# Patient Record
Sex: FEMALE | Race: AMERICAN INDIAN OR ALASKA NATIVE | Employment: PART TIME | URBAN - METROPOLITAN AREA
[De-identification: names, ages, dates, MRNs, and addresses within clinical notes are randomized per-mention and may not be internally consistent; named-entity substitution may affect disease eponyms.]

---

## 2018-02-01 ENCOUNTER — HOSPITAL ENCOUNTER (EMERGENCY)
Age: 48
Discharge: HOME | End: 2018-02-01
Attending: EMERGENCY MEDICINE
Payer: SELF-PAY

## 2018-02-01 ENCOUNTER — APPOINTMENT (OUTPATIENT)
Dept: GENERAL RADIOLOGY | Age: 48
End: 2018-02-01
Attending: EMERGENCY MEDICINE
Payer: SELF-PAY

## 2018-02-01 VITALS
BODY MASS INDEX: 31.01 KG/M2 | TEMPERATURE: 98 F | HEIGHT: 63 IN | DIASTOLIC BLOOD PRESSURE: 104 MMHG | RESPIRATION RATE: 15 BRPM | SYSTOLIC BLOOD PRESSURE: 154 MMHG | WEIGHT: 175 LBS | OXYGEN SATURATION: 100 % | HEART RATE: 85 BPM

## 2018-02-01 DIAGNOSIS — M25.562 ACUTE PAIN OF LEFT KNEE: Primary | ICD-10-CM

## 2018-02-01 PROCEDURE — 73564 X-RAY EXAM KNEE 4 OR MORE: CPT

## 2018-02-01 PROCEDURE — 99282 EMERGENCY DEPT VISIT SF MDM: CPT

## 2018-02-01 NOTE — ED PROVIDER NOTES
EMERGENCY DEPARTMENT HISTORY AND PHYSICAL EXAM    4:26 PM      Date: 2/1/2018  Patient Name: Elana Gonzales    History of Presenting Illness     Chief Complaint   Patient presents with    Knee Pain         History Provided By: Patient     Chief Complaint: Knee Pain  Duration:  Onset Thanksgiving 2017  Timing:  Intermittent   Location: Left Knee  Quality: Tightness  Severity: 6/10  Modifying Factors: Patient has been using Ice and Ibuprofen as pain interventions, with no relief in her symptoms. Notes that the tightness is exacerbated when bending. Associated Symptoms: Denies any other associated symptoms or concerns       Additional History (Context): Elana Gonzales is a 56 y.o. female presents with left knee pain since Thanksgiving 2017. Patient states that on Thanksgiving, she was using a chair as a ladder, and fell off of the chair onto her left knee. Patient has been using Ice and Ibuprofen as pain interventions, with no relief in her symptoms. Notes that the tightness is exacerbated when bending. Admits to current Tobacco use. Patient expresses no other concerns or symptoms at this time. PCP: Emani Echevarria MD        Past History     Past Medical History:  Past Medical History:   Diagnosis Date    Hypertension        Past Surgical History:  Past Surgical History:   Procedure Laterality Date    HX BREAST BIOPSY         Family History:  No family history on file. Social History:  Social History   Substance Use Topics    Smoking status: Current Every Day Smoker     Packs/day: 1.00    Smokeless tobacco: Never Used    Alcohol use Yes      Comment: rarely       Allergies: Allergies   Allergen Reactions    Dextromethorphan Rash    Penicillins Rash         Review of Systems     Review of Systems   Constitutional: Negative for diaphoresis and fever. HENT: Negative for congestion and sore throat. Eyes: Negative for pain and itching. Respiratory: Negative for cough and shortness of breath. Cardiovascular: Negative for chest pain and palpitations. Gastrointestinal: Negative for abdominal pain and diarrhea. Endocrine: Negative for polydipsia and polyuria. Genitourinary: Negative for dysuria and hematuria. Musculoskeletal: Positive for myalgias. Negative for arthralgias. Patient c/o left knee pain    Skin: Negative for rash and wound. Neurological: Negative for seizures and syncope. Hematological: Does not bruise/bleed easily. Psychiatric/Behavioral: Negative for agitation and hallucinations. Physical Exam     Visit Vitals    BP (!) 154/104 (BP 1 Location: Right arm, BP Patient Position: At rest)    Pulse 85    Temp 98 °F (36.7 °C)    Resp 15    Ht 5' 3\" (1.6 m)    Wt 79.4 kg (175 lb)    LMP  (LMP Unknown)    SpO2 100%    BMI 31 kg/m2       Physical Exam   Constitutional: She appears well-developed and well-nourished. HENT:   Head: Normocephalic and atraumatic. Eyes: Conjunctivae are normal. No scleral icterus. Neck: Normal range of motion. Neck supple. No JVD present. Cardiovascular: Normal rate, regular rhythm and intact distal pulses. Pulmonary/Chest: Effort normal. No respiratory distress. Musculoskeletal: Normal range of motion. Left knee: She exhibits no LCL laxity and no MCL laxity. Left knee: Good ROM  Quadricep tenderness  No other tenderness  No redness, swelling, warmth, or effusion   Neurological: She is alert. Skin: Skin is warm and dry. Psychiatric: Judgment and thought content normal.   Nursing note and vitals reviewed. Diagnostic Study Results     Labs -  No results found for this or any previous visit (from the past 12 hour(s)). Radiologic Studies -   XR KNEE LT MIN 4 V   Showed:  Negative X-ray     No results found. Medical Decision Making   Initial Medical Decision Making and DDx:  I don't suspect fracture or ligamentous injury. I don't suspect septic joint. Likely musculoskeletal pain.  I discussed anti-inflammatories, cold therapy, and rest. I discussed that the patient follow-up with her PCP. Patient's questions were answered, and she is happy with the plan. ED Course: Progress Notes, Reevaluation, and Consults:  6:10PM: Patient has a negative X-ray, read by me.     6:13PM: I discussed the patient's X-ray with the patient. I re-iterated support of management. Patient's questions were answered, and she is happy with the plan for discharge. I am the first provider for this patient. I reviewed the vital signs, available nursing notes, past medical history, past surgical history, family history and social history. Vital Signs-Reviewed the patient's vital signs. Pulse Oximetry Analysis - 100%    Records Reviewed: Nursing Notes and Triage notes (Time of Review: 4:26 PM)    Procedures: None     Critical Care Time: None       Diagnosis     Clinical Impression:   1. Acute pain of left knee        Disposition: Discharged     Follow-up Information     Follow up With Details Comments 63 Shena Neumann MD   [address] [address]  [address] [place] [address]) [phone]    [facility]   [address] [place] [address]   [phone]           Patient's Medications   Start Taking    No medications on file   Continue Taking    No medications on file   These Medications have changed    No medications on file   Stop Taking    HYDROCODONE-ACETAMINOPHEN (NORCO) 5-325 MG PER TABLET    Take 1 Tab by mouth every six (6) hours as needed for Pain.  Max Daily Amount: 4 Tabs.     _______________________________    Attestations:  Scribe Attestation     Re Bland acting as a scribe for and in the presence of Gary Boykin MD      February 01, 2018 at 4:32 PM       Provider Attestation:      I personally performed the services described in the documentation, reviewed the documentation, as recorded by the scribe in my presence, and it accurately and completely records my words and actions.  February 01, 2018 at 4:32 PM - Musa Del Angel MD    _______________________________

## 2018-02-01 NOTE — DISCHARGE INSTRUCTIONS
Joint Pain: Care Instructions  Your Care Instructions    Many people have small aches and pains from overuse or injury to muscles and joints. Joint injuries often happen during sports or recreation, work tasks, or projects around the home. An overuse injury can happen when you put too much stress on a joint or when you do an activity that stresses the joint over and over, such as using the computer or rowing a boat. You can take action at home to help your muscles and joints get better. You should feel better in 1 to 2 weeks, but it can take 3 months or more to heal completely. Follow-up care is a key part of your treatment and safety. Be sure to make and go to all appointments, and call your doctor if you are having problems. It's also a good idea to know your test results and keep a list of the medicines you take. How can you care for yourself at home? · Do not put weight on the injured joint for at least a day or two. · For the first day or two after an injury, do not take hot showers or baths, and do not use hot packs. The heat could make swelling worse. · Put ice or a cold pack on the sore joint for 10 to 20 minutes at a time. Try to do this every 1 to 2 hours for the next 3 days (when you are awake) or until the swelling goes down. Put a thin cloth between the ice and your skin. · Wrap the injury in an elastic bandage. Do not wrap it too tightly because this can cause more swelling. · Prop up the sore joint on a pillow when you ice it or anytime you sit or lie down during the next 3 days. Try to keep it above the level of your heart. This will help reduce swelling. · Take an over-the-counter pain medicine, such as acetaminophen (Tylenol), ibuprofen (Advil, Motrin), or naproxen (Aleve). Read and follow all instructions on the label. · After 1 or 2 days of rest, begin moving the joint gently.  While the joint is still healing, you can begin to exercise using activities that do not strain or hurt the painful joint. When should you call for help? Call your doctor now or seek immediate medical care if:  ? · You have signs of infection, such as:  ¨ Increased pain, swelling, warmth, and redness. ¨ Red streaks leading from the joint. ¨ A fever. ? Watch closely for changes in your health, and be sure to contact your doctor if:  ? · Your movement or symptoms are not getting better after 1 to 2 weeks of home treatment. Where can you learn more? Go to http://obie-bob.info/. Enter P205 in the search box to learn more about \"Joint Pain: Care Instructions. \"  Current as of: March 21, 2017  Content Version: 11.4  © 3084-5080 [employer]. Care instructions adapted under license by [employer] (which disclaims liability or warranty for this information). If you have questions about a medical condition or this instruction, always ask your healthcare professional. [address] any warranty or liability for your use of this information.

## 2018-04-18 ENCOUNTER — HOSPITAL ENCOUNTER (EMERGENCY)
Age: 48
Discharge: HOME | End: 2018-04-18
Attending: EMERGENCY MEDICINE
Payer: SELF-PAY

## 2018-04-18 VITALS
WEIGHT: 180 LBS | RESPIRATION RATE: 16 BRPM | TEMPERATURE: 98.4 F | BODY MASS INDEX: 31.89 KG/M2 | HEART RATE: 79 BPM | SYSTOLIC BLOOD PRESSURE: 147 MMHG | DIASTOLIC BLOOD PRESSURE: 96 MMHG | OXYGEN SATURATION: 100 % | HEIGHT: 63 IN

## 2018-04-18 DIAGNOSIS — M21.42 FLAT FEET, BILATERAL: ICD-10-CM

## 2018-04-18 DIAGNOSIS — R03.0 ELEVATED BLOOD PRESSURE READING: ICD-10-CM

## 2018-04-18 DIAGNOSIS — M21.41 FLAT FEET, BILATERAL: ICD-10-CM

## 2018-04-18 DIAGNOSIS — M72.2 PLANTAR FASCIITIS: Primary | ICD-10-CM

## 2018-04-18 PROCEDURE — 99282 EMERGENCY DEPT VISIT SF MDM: CPT

## 2018-04-18 RX ORDER — IBUPROFEN 200 MG
200 TABLET ORAL AS NEEDED
COMMUNITY
End: 2020-10-27

## 2018-04-18 RX ORDER — IBUPROFEN 800 MG/1
200 TABLET ORAL
COMMUNITY
End: 2018-04-18

## 2018-04-18 RX ORDER — NAPROXEN 500 MG/1
500 TABLET ORAL 2 TIMES DAILY WITH MEALS
Qty: 20 TAB | Refills: 0 | Status: SHIPPED | OUTPATIENT
Start: 2018-04-18 | End: 2018-04-28

## 2018-04-18 NOTE — ED PROVIDER NOTES
EMERGENCY DEPARTMENT HISTORY AND PHYSICAL EXAM    6:27 AM      Date: 4/18/2018  Patient Name: Jesu De León    History of Presenting Illness     Chief Complaint   Patient presents with    Foot Pain     left          History Provided By: Patient    Chief Complaint: Foot pain  Duration: 3 Months  Timing:  Constant and Worsening  Location: L foot  Quality: \"like I'm stepping on a flat rock tied to my foot\", radiating to lower leg  Severity: 8 out of 10  Modifying Factors: mildly improved by Ibuprofen   Associated Symptoms: denies any other associated signs or symptoms      Additional History (Context): Jesu De León is a 56 y.o. female with hypertension who presents with L foot pain x3 months, worsening the past week. Pain is mostly on bottom of foot/heel and there is no hx prior. Pt denies leg swelling, long travel, hx DVT/PE, birth control. Pt wears work-safe shoes as she stands often and works long hours; recently switched to [employer]. She also states she's got flat feet. No current tobacco use. PCP: Dejah Palafox MD        Past History     Past Medical History:  Past Medical History:   Diagnosis Date    Hypertension     Murmur, cardiac        Past Surgical History:  Past Surgical History:   Procedure Laterality Date    HX BREAST BIOPSY      biopsy       Family History:  History reviewed. No pertinent family history. Social History:  Social History   Substance Use Topics    Smoking status: Current Every Day Smoker     Packs/day: 1.00    Smokeless tobacco: Never Used    Alcohol use Yes      Comment: rarely       Allergies: Allergies   Allergen Reactions    Dextromethorphan Rash    Penicillins Rash         Review of Systems       Review of Systems   Constitutional: Negative for fatigue and fever. Cardiovascular: Negative for leg swelling. Musculoskeletal: Negative for joint swelling. Positive for L foot pain   All other systems reviewed and are negative.         Physical Exam Visit Vitals    BP (!) 147/96 (BP 1 Location: Left arm, BP Patient Position: At rest)    Pulse 79    Temp 98.4 °F (36.9 °C)    Resp 16    Ht 5' 3\" (1.6 m)    Wt 81.6 kg (180 lb)    LMP  (LMP Unknown)    SpO2 100%    BMI 31.89 kg/m2         Physical Exam   Constitutional: She is oriented to person, place, and time. She appears well-developed and well-nourished. HENT:   Head: Normocephalic and atraumatic. Neck: Neck supple. No JVD present. Cardiovascular:   L DP 2+   Musculoskeletal: She exhibits no edema. Feet:    LLE: cap refill 1 sec  No calf tenderness  No malleolar tenderness  ROM intact in dorsi/planatar flexion  Gross sensation intact LLE   Neurological: She is alert and oriented to person, place, and time. Skin: Skin is warm and dry. No rash noted. No erythema. Diagnostic Study Results         Medical Decision Making   I am the first provider for this patient. I reviewed the vital signs, available nursing notes, past medical history, past surgical history, family history and social history. Vital Signs-Reviewed the patient's vital signs. Pulse Oximetry Analysis -  100% on room air (Interpretation) nonhypoxic     Cardiac Monitor:  Rate: 79    Records Reviewed: Nursing Notes (Time of Review: 6:27 AM)    ED Course: Progress Notes, Reevaluation, and Consults:  Provider Notes (Medical Decision Making): 55 y/o female presents with L foot pain. Seems conssitent with plantar fascitis. No trauma, no sign of cellulitis, no sign of DVT and no DVT risk factors. Will give naprosyn, exercises and podiatry follow up. Pt stable for dc home. Pt noted to have elevated BP. Pt is asymptomatic and was referred to PCP for follow up. Diagnosis     Clinical Impression:   1. Plantar fasciitis    2. Flat feet, bilateral    3.  Elevated blood pressure reading        Disposition: Discharge     Follow-up Information     Follow up With Details Comments Contact Cassidy Aguilera MD Schedule an appointment as soon as possible for a visit As needed [address]      Joey Stanley DPM Schedule an appointment as soon as possible for a visit in 2 days As needed for further evaluation Chaim Crawford  [phone]             Patient's Medications   Start Taking    NAPROXEN (NAPROSYN) 500 MG TABLET    Take 1 Tab by mouth two (2) times daily (with meals) for 10 days. Continue Taking    IBUPROFEN (MOTRIN) 200 MG TABLET    Take 200 mg by mouth as needed for Pain. These Medications have changed    No medications on file   Stop Taking    IBUPROFEN (MOTRIN) 800 MG TABLET    Take 200 mg by mouth.     _______________________________    Attestations:  Scribe Attestation     Flaca Armenta acting as a scribe for and in the presence of Miguel Alberto DO      April 18, 2018 at 6:37 AM       Provider Attestation:      I personally performed the services described in the documentation, reviewed the documentation, as recorded by the scribe in my presence, and it accurately and completely records my words and actions.  April 18, 2018 at 6:37 AM - Miguel Alberto DO    _______________________________

## 2019-04-16 ENCOUNTER — APPOINTMENT (OUTPATIENT)
Dept: CT IMAGING | Age: 49
End: 2019-04-16
Attending: EMERGENCY MEDICINE
Payer: COMMERCIAL

## 2019-04-16 ENCOUNTER — HOSPITAL ENCOUNTER (EMERGENCY)
Age: 49
Discharge: HOME | End: 2019-04-16
Attending: EMERGENCY MEDICINE
Payer: COMMERCIAL

## 2019-04-16 ENCOUNTER — APPOINTMENT (OUTPATIENT)
Dept: ULTRASOUND IMAGING | Age: 49
End: 2019-04-16
Attending: EMERGENCY MEDICINE
Payer: COMMERCIAL

## 2019-04-16 VITALS
HEART RATE: 85 BPM | OXYGEN SATURATION: 100 % | RESPIRATION RATE: 16 BRPM | TEMPERATURE: 98.2 F | SYSTOLIC BLOOD PRESSURE: 145 MMHG | DIASTOLIC BLOOD PRESSURE: 85 MMHG

## 2019-04-16 DIAGNOSIS — R10.84 ABDOMINAL PAIN, GENERALIZED: Primary | ICD-10-CM

## 2019-04-16 LAB
ALBUMIN SERPL-MCNC: 4.1 G/DL (ref 3.4–5)
ALBUMIN/GLOB SERPL: 1.1 {RATIO} (ref 0.8–1.7)
ALP SERPL-CCNC: 167 U/L (ref 45–117)
ALT SERPL-CCNC: 24 U/L (ref 13–56)
ANION GAP SERPL CALC-SCNC: 5 MMOL/L (ref 3–18)
APPEARANCE UR: CLEAR
AST SERPL-CCNC: 18 U/L (ref 15–37)
BACTERIA URNS QL MICRO: NEGATIVE /HPF
BASOPHILS # BLD: 0.1 K/UL (ref 0–0.1)
BASOPHILS NFR BLD: 1 % (ref 0–2)
BILIRUB SERPL-MCNC: 0.2 MG/DL (ref 0.2–1)
BILIRUB UR QL: NEGATIVE
BUN SERPL-MCNC: 6 MG/DL (ref 7–18)
BUN/CREAT SERPL: 6 (ref 12–20)
CALCIUM SERPL-MCNC: 8.9 MG/DL (ref 8.5–10.1)
CHLORIDE SERPL-SCNC: 105 MMOL/L (ref 100–108)
CO2 SERPL-SCNC: 28 MMOL/L (ref 21–32)
COLOR UR: YELLOW
CREAT SERPL-MCNC: 0.95 MG/DL (ref 0.6–1.3)
DIFFERENTIAL METHOD BLD: ABNORMAL
EOSINOPHIL # BLD: 0.1 K/UL (ref 0–0.4)
EOSINOPHIL NFR BLD: 1 % (ref 0–5)
EPITH CASTS URNS QL MICRO: NORMAL /LPF (ref 0–5)
ERYTHROCYTE [DISTWIDTH] IN BLOOD BY AUTOMATED COUNT: 12.9 % (ref 11.6–14.5)
GLOBULIN SER CALC-MCNC: 3.9 G/DL (ref 2–4)
GLUCOSE SERPL-MCNC: 89 MG/DL (ref 74–99)
GLUCOSE UR STRIP.AUTO-MCNC: NEGATIVE MG/DL
HCG UR QL: NEGATIVE
HCT VFR BLD AUTO: 42.1 % (ref 35–45)
HGB BLD-MCNC: 14 G/DL (ref 12–16)
HGB UR QL STRIP: ABNORMAL
KETONES UR QL STRIP.AUTO: NEGATIVE MG/DL
LEUKOCYTE ESTERASE UR QL STRIP.AUTO: NEGATIVE
LIPASE SERPL-CCNC: 200 U/L (ref 73–393)
LYMPHOCYTES # BLD: 3.9 K/UL (ref 0.9–3.6)
LYMPHOCYTES NFR BLD: 50 % (ref 21–52)
MCH RBC QN AUTO: 28.7 PG (ref 24–34)
MCHC RBC AUTO-ENTMCNC: 33.3 G/DL (ref 31–37)
MCV RBC AUTO: 86.3 FL (ref 74–97)
MONOCYTES # BLD: 0.5 K/UL (ref 0.05–1.2)
MONOCYTES NFR BLD: 7 % (ref 3–10)
NEUTS SEG # BLD: 3.3 K/UL (ref 1.8–8)
NEUTS SEG NFR BLD: 41 % (ref 40–73)
NITRITE UR QL STRIP.AUTO: NEGATIVE
PH UR STRIP: 5.5 [PH] (ref 5–8)
PLATELET # BLD AUTO: 317 K/UL (ref 135–420)
PMV BLD AUTO: 9.2 FL (ref 9.2–11.8)
POTASSIUM SERPL-SCNC: 3.4 MMOL/L (ref 3.5–5.5)
PROT SERPL-MCNC: 8 G/DL (ref 6.4–8.2)
PROT UR STRIP-MCNC: NEGATIVE MG/DL
RBC # BLD AUTO: 4.88 M/UL (ref 4.2–5.3)
RBC #/AREA URNS HPF: NORMAL /HPF (ref 0–5)
SODIUM SERPL-SCNC: 138 MMOL/L (ref 136–145)
SP GR UR REFRACTOMETRY: 1.01 (ref 1–1.03)
UROBILINOGEN UR QL STRIP.AUTO: 0.2 EU/DL (ref 0.2–1)
WBC # BLD AUTO: 7.9 K/UL (ref 4.6–13.2)
WBC URNS QL MICRO: NORMAL /HPF (ref 0–4)

## 2019-04-16 PROCEDURE — 96374 THER/PROPH/DIAG INJ IV PUSH: CPT

## 2019-04-16 PROCEDURE — 74177 CT ABD & PELVIS W/CONTRAST: CPT

## 2019-04-16 PROCEDURE — 85025 COMPLETE CBC W/AUTO DIFF WBC: CPT

## 2019-04-16 PROCEDURE — 81001 URINALYSIS AUTO W/SCOPE: CPT

## 2019-04-16 PROCEDURE — 96361 HYDRATE IV INFUSION ADD-ON: CPT

## 2019-04-16 PROCEDURE — 81025 URINE PREGNANCY TEST: CPT

## 2019-04-16 PROCEDURE — 74011250637 HC RX REV CODE- 250/637: Performed by: EMERGENCY MEDICINE

## 2019-04-16 PROCEDURE — 76705 ECHO EXAM OF ABDOMEN: CPT

## 2019-04-16 PROCEDURE — 74011250636 HC RX REV CODE- 250/636: Performed by: EMERGENCY MEDICINE

## 2019-04-16 PROCEDURE — 99283 EMERGENCY DEPT VISIT LOW MDM: CPT

## 2019-04-16 PROCEDURE — 83690 ASSAY OF LIPASE: CPT

## 2019-04-16 PROCEDURE — 74011636320 HC RX REV CODE- 636/320: Performed by: EMERGENCY MEDICINE

## 2019-04-16 PROCEDURE — 80053 COMPREHEN METABOLIC PANEL: CPT

## 2019-04-16 RX ORDER — FAMOTIDINE 10 MG/ML
20 INJECTION INTRAVENOUS
Status: COMPLETED | OUTPATIENT
Start: 2019-04-16 | End: 2019-04-16

## 2019-04-16 RX ORDER — ACETAMINOPHEN 325 MG/1
650 TABLET ORAL
Qty: 20 TAB | Refills: 0 | Status: SHIPPED | OUTPATIENT
Start: 2019-04-16 | End: 2020-10-27

## 2019-04-16 RX ORDER — MAG HYDROX/ALUMINUM HYD/SIMETH 200-200-20
30 SUSPENSION, ORAL (FINAL DOSE FORM) ORAL
Status: COMPLETED | OUTPATIENT
Start: 2019-04-16 | End: 2019-04-16

## 2019-04-16 RX ORDER — MAG HYDROX/ALUMINUM HYD/SIMETH 200-200-20
30 SUSPENSION, ORAL (FINAL DOSE FORM) ORAL
Qty: 200 ML | Refills: 0 | Status: SHIPPED | OUTPATIENT
Start: 2019-04-16 | End: 2020-10-27

## 2019-04-16 RX ORDER — FAMOTIDINE 20 MG/1
20 TABLET, FILM COATED ORAL 2 TIMES DAILY
Qty: 20 TAB | Refills: 0 | Status: SHIPPED | OUTPATIENT
Start: 2019-04-16 | End: 2019-04-26

## 2019-04-16 RX ADMIN — SODIUM CHLORIDE 1000 ML: 900 INJECTION, SOLUTION INTRAVENOUS at 16:02

## 2019-04-16 RX ADMIN — IOPAMIDOL 96 ML: 612 INJECTION, SOLUTION INTRAVENOUS at 16:40

## 2019-04-16 RX ADMIN — FAMOTIDINE 20 MG: 10 INJECTION, SOLUTION INTRAVENOUS at 16:04

## 2019-04-16 RX ADMIN — ALUMINUM HYDROXIDE, MAGNESIUM HYDROXIDE, AND SIMETHICONE 30 ML: 200; 200; 20 SUSPENSION ORAL at 16:04

## 2019-04-16 NOTE — ED PROVIDER NOTES
EMERGENCY DEPARTMENT HISTORY AND PHYSICAL EXAM 
 
 
Date: 4/16/2019 Patient Name: Corwin Blank 
 
History of Presenting Illness Chief Complaint Patient presents with  Abdominal Pain History Provided By: Patient HPI/Chief Complaint: (Context):who presents with chief complaint of upper/mid abdominal pain no trauma no related to meals it is constant progressively 4 days of symptoms crampy pain no radiation of symptoms no black or bloody stool no history of gastritis ulcer or gallbladder disease no URI symptoms no vaginal bleeding is no UTI symptoms no dysuria no vaginal bleeding or discharge. Patient states this is happened several years ago but not recently Patient does not have any NSAID use. No alcohol use No history of pancreatitis ------------------------ Patient's triage note is reviewed Patient's triage vitals show blood pressure 151/102 Patient has abdominal pain documented to be for last 4 days Patient's allergies to dextromethorphan and penicillin patient's home medication are documented is ibuprofen Patient's medical problems includes hypertension and cardiac markers Patient surgical history includes breast biopsy Patient is a current smoker and alcohol use occasionally. Patient's prior medical records show the patient has been seen for foot pain and knee pain and abdominal pain in the past. 
 
 
PCP: Audrey Mejia MD 
 
Current Outpatient Medications Medication Sig Dispense Refill  famotidine (PEPCID) 20 mg tablet Take 1 Tab by mouth two (2) times a day for 10 days. 20 Tab 0  
 alum-mag hydroxide-simeth (MYLANTA) 200-200-20 mg/5 mL susp Take 30 mL by mouth four (4) times daily as needed for Pain. 200 mL 0  
 acetaminophen (TYLENOL) 325 mg tablet Take 2 Tabs by mouth every four (4) hours as needed for Pain. 20 Tab 0  ibuprofen (MOTRIN) 200 mg tablet Take 200 mg by mouth as needed for Pain. Past History Past Medical History: Past Medical History:  
Diagnosis Date  Hypertension  Murmur, cardiac Past Surgical History: 
Past Surgical History:  
Procedure Laterality Date  HX BREAST BIOPSY    
 biopsy Family History: No family history on file. Social History: 
Social History Tobacco Use  Smoking status: Current Every Day Smoker Packs/day: 1.00  Smokeless tobacco: Never Used Substance Use Topics  Alcohol use: Yes Comment: rarely  Drug use: No  
 
 
Allergies: Allergies Allergen Reactions  Dextromethorphan Rash  Penicillins Rash Review of Systems Review of Systems Constitutional: Negative for activity change, fatigue and fever. HENT: Negative for congestion and rhinorrhea. Eyes: Negative for visual disturbance. Respiratory: Negative for shortness of breath. Cardiovascular: Negative for chest pain and palpitations. Gastrointestinal: Positive for abdominal pain. Negative for anal bleeding, blood in stool and vomiting. Genitourinary: Negative for dysuria and hematuria. Musculoskeletal: Negative for back pain. Skin: Negative for rash. Neurological: Negative for dizziness, weakness and light-headedness. Psychiatric/Behavioral: Negative for agitation. All other systems reviewed and are negative. Physical Exam  
 
Physical Exam  
Constitutional: She appears well-developed and well-nourished. No distress. HENT:  
Head: Normocephalic and atraumatic. Right Ear: External ear normal.  
Left Ear: External ear normal.  
Nose: Nose normal.  
Mouth/Throat: Oropharynx is clear and moist.  
Eyes: Pupils are equal, round, and reactive to light. Conjunctivae and EOM are normal. No scleral icterus. Neck: Normal range of motion. Neck supple. No JVD present. No tracheal deviation present. No thyromegaly present. Cardiovascular: Normal rate and regular rhythm. Exam reveals no friction rub. No murmur heard. Pulmonary/Chest: Effort normal and breath sounds normal. No stridor. She exhibits no tenderness. Abdominal: Soft. Bowel sounds are normal. There is tenderness. There is no rebound and no guarding. Patient with mid abdomen tenderness no hernia or pulsatile mass appreciated no rigidity or rebound Musculoskeletal: Normal range of motion. She exhibits no edema or tenderness. Lymphadenopathy:  
  She has no cervical adenopathy. Neurological: She is alert. No cranial nerve deficit. Coordination normal.  
Skin: Skin is warm and dry. Psychiatric: She has a normal mood and affect. Her behavior is normal. Judgment and thought content normal.  
Nursing note and vitals reviewed. Medical Decision Making I am the first provider for this patient. I reviewed the vital signs, available nursing notes, past medical history, past surgical history, family history and social history. Provider Notes (Medical Decision Making): Abdominal pain constant for days CT/ultrasound to rule out any gallbladder start/liver/pancreatic disease CT to rule out any bowel obstruction or masses Patient's lab to evaluate for any electrolyte/urinary/renal/liver/pancreatic abnormality I will check to make sure patient is getting symptomatic treatment as well. Patient CT scan does not show any acute pathology Patient's ultrasound does not show any acute pathology patient's labs do not show any acute pathology Vital Signs-Reviewed the patient's vital signs. Pulse Oximetry Analysis -100% on room air, normal, Vitals:  
 04/16/19 1452 BP: (!) 151/102 Pulse: 98 Resp: 16 Temp: 98.2 °F (36.8 °C) SpO2: 100% Records Reviewed: Nursing Notes ED Course:  
 Patient reevaluated at 5:30 PM 
Patient no distress.   Symptoms are much improved we discussed CT ultrasound and lab finding patient understands to follow-up as patient's clinically not showing any signs of ulcer and there is no clinical finding of gallbladder disease bowel obstruction or any hepatic disease on the labs Patient's urine is clear as well Patient agrees and understands instructions and return if anything changes or worsen. Diagnostic Study Results Labs - Recent Results (from the past 12 hour(s)) URINALYSIS W/ RFLX MICROSCOPIC Collection Time: 04/16/19  3:10 PM  
Result Value Ref Range Color YELLOW Appearance CLEAR Specific gravity 1.007 1.005 - 1.030    
 pH (UA) 5.5 5.0 - 8.0 Protein NEGATIVE  NEG mg/dL Glucose NEGATIVE  NEG mg/dL Ketone NEGATIVE  NEG mg/dL Bilirubin NEGATIVE  NEG Blood TRACE (A) NEG Urobilinogen 0.2 0.2 - 1.0 EU/dL Nitrites NEGATIVE  NEG Leukocyte Esterase NEGATIVE  NEG    
HCG URINE, QL Collection Time: 04/16/19  3:10 PM  
Result Value Ref Range HCG urine, QL NEGATIVE  NEG    
URINE MICROSCOPIC ONLY Collection Time: 04/16/19  3:10 PM  
Result Value Ref Range WBC 0 to 1 0 - 4 /hpf  
 RBC 0 to 2 0 - 5 /hpf Epithelial cells FEW 0 - 5 /lpf Bacteria NEGATIVE  NEG /hpf  
CBC WITH AUTOMATED DIFF Collection Time: 04/16/19  3:25 PM  
Result Value Ref Range WBC 7.9 4.6 - 13.2 K/uL  
 RBC 4.88 4.20 - 5.30 M/uL  
 HGB 14.0 12.0 - 16.0 g/dL HCT 42.1 35.0 - 45.0 % MCV 86.3 74.0 - 97.0 FL  
 MCH 28.7 24.0 - 34.0 PG  
 MCHC 33.3 31.0 - 37.0 g/dL  
 RDW 12.9 11.6 - 14.5 % PLATELET 886 632 - 862 K/uL MPV 9.2 9.2 - 11.8 FL  
 NEUTROPHILS 41 40 - 73 % LYMPHOCYTES 50 21 - 52 % MONOCYTES 7 3 - 10 % EOSINOPHILS 1 0 - 5 % BASOPHILS 1 0 - 2 %  
 ABS. NEUTROPHILS 3.3 1.8 - 8.0 K/UL  
 ABS. LYMPHOCYTES 3.9 (H) 0.9 - 3.6 K/UL  
 ABS. MONOCYTES 0.5 0.05 - 1.2 K/UL  
 ABS. EOSINOPHILS 0.1 0.0 - 0.4 K/UL  
 ABS. BASOPHILS 0.1 0.0 - 0.1 K/UL  
 DF AUTOMATED METABOLIC PANEL, COMPREHENSIVE Collection Time: 04/16/19  3:25 PM  
Result Value Ref Range Sodium 138 136 - 145 mmol/L  Potassium 3.4 (L) 3.5 - 5.5 mmol/L  
 Chloride 105 100 - 108 mmol/L  
 CO2 28 21 - 32 mmol/L Anion gap 5 3.0 - 18 mmol/L Glucose 89 74 - 99 mg/dL BUN 6 (L) 7.0 - 18 MG/DL Creatinine 0.95 0.6 - 1.3 MG/DL  
 BUN/Creatinine ratio 6 (L) 12 - 20 GFR est AA >60 >60 ml/min/1.73m2 GFR est non-AA >60 >60 ml/min/1.73m2 Calcium 8.9 8.5 - 10.1 MG/DL Bilirubin, total 0.2 0.2 - 1.0 MG/DL  
 ALT (SGPT) 24 13 - 56 U/L  
 AST (SGOT) 18 15 - 37 U/L Alk. phosphatase 167 (H) 45 - 117 U/L Protein, total 8.0 6.4 - 8.2 g/dL Albumin 4.1 3.4 - 5.0 g/dL Globulin 3.9 2.0 - 4.0 g/dL A-G Ratio 1.1 0.8 - 1.7 LIPASE Collection Time: 04/16/19  3:25 PM  
Result Value Ref Range Lipase 200 73 - 393 U/L Radiologic Studies -  
CT ABD PELV W CONT Final Result IMPRESSION:  
  
1. No evidence of appendicitis or other acute abdominal or pelvic CT finding. 2. Mild interval increased size partially exophytic 4 cm uterine fibroid left  
posterior uterine body. US ABD LTD Final Result IMPRESSION:  
  
No acute sonographic abnormality demonstrated. No cholelithiasis or findings to  
suggest cholecystitis. Stuart Blue CT Results  (Last 48 hours) 04/16/19 1641  CT ABD PELV W CONT Final result Impression:  IMPRESSION:  
   
1. No evidence of appendicitis or other acute abdominal or pelvic CT finding. 2. Mild interval increased size partially exophytic 4 cm uterine fibroid left  
posterior uterine body. Narrative:  EXAM:CT abdomen pelvis with contrast  
   
CLINICAL INDICATION/HISTORY: Abdominal pain COMPARISON: 11/27/2011, right upper quadrant ultrasound 4/16/2019. TECHNIQUE: Standard helical CT performed through the abdomen and pelvis with IV  
contrast.  Coronal and sagittal reformations were generated. One or more dose  
reduction techniques were used on this CT: automated exposure control,  
adjustment of the mAs and/or kVp according to patient's size, and iterative reconstruction techniques. The specific techniques utilized on this CT exam have  
been documented in the patient's electronic medical record. Digital imaging and  
communications and medicine (DICOM) format image data are available to  
nonaffiliated external healthcare facilities or entities on a secure, media  
free, reciprocally searchable basis with patient authorization for at least a 12  
month period after this study. _______________ FINDINGS:  
   
INFERIOR THORAX: Normal.  
   
LIVER/BILIARY: No suspicious liver lesion. No biliary dilation. Gallbladder  
unremarkable. SPLEEN: Normal.  
   
PANCREAS: Normal.  
   
ADRENALS: Normal.  
   
KIDNEYS: Normal.  
   
LYMPH NODES: No technically enlarged nodes. GI TRACT: No wall thickening or dilation. Normal appendix. No ascites or free  
air. VASCULATURE: Unremarkable. PELVIC ORGANS: Interval increased size partially exophytic rounded hypodense  
lesion left posterior uterine body now 4.0 x 3.4 cm in greatest cephalocaudad  
and transverse dimensions compared to 3.7 x 2.7 cm using similar measuring  
technique on prior study. Remainder bladder uterus and adnexa unremarkable. OTHER: No aggressive appearing osseous lesion. _______________ CXR Results  (Last 48 hours) None Discharge Clinical Impression: 1. Abdominal pain, generalized Disposition: 
Home It should be noted that I will be the provider of record for this patient Aurea Holloway MD, Yamilka Burton Follow-up Information Follow up With Specialties Details Why Contact Info Lisseth Hopper MD Family Practice Call in 1 day Follow Up From Emergency Department [facility] [phone] [facility] [id number] [phone] Current Discharge Medication List  
  
START taking these medications Details  
famotidine (PEPCID) 20 mg tablet Take 1 Tab by mouth two (2) times a day for 10 days. Qty: 20 Tab, Refills: 0  
  
alum-mag hydroxide-simeth (MYLANTA) 200-200-20 mg/5 mL susp Take 30 mL by mouth four (4) times daily as needed for Pain. Qty: 200 mL, Refills: 0  
  
acetaminophen (TYLENOL) 325 mg tablet Take 2 Tabs by mouth every four (4) hours as needed for Pain. Qty: 20 Tab, Refills: 0

## 2019-04-16 NOTE — DISCHARGE INSTRUCTIONS

## 2019-06-07 ENCOUNTER — APPOINTMENT (OUTPATIENT)
Dept: CT IMAGING | Age: 49
End: 2019-06-07
Attending: PHYSICIAN ASSISTANT
Payer: COMMERCIAL

## 2019-06-07 ENCOUNTER — HOSPITAL ENCOUNTER (EMERGENCY)
Age: 49
Discharge: HOME | End: 2019-06-07
Attending: EMERGENCY MEDICINE
Payer: COMMERCIAL

## 2019-06-07 VITALS
HEIGHT: 63 IN | HEART RATE: 89 BPM | BODY MASS INDEX: 32.78 KG/M2 | RESPIRATION RATE: 16 BRPM | SYSTOLIC BLOOD PRESSURE: 129 MMHG | TEMPERATURE: 98 F | WEIGHT: 185 LBS | OXYGEN SATURATION: 99 % | DIASTOLIC BLOOD PRESSURE: 82 MMHG

## 2019-06-07 DIAGNOSIS — K52.9 COLITIS: Primary | ICD-10-CM

## 2019-06-07 LAB
ALBUMIN SERPL-MCNC: 3.7 G/DL (ref 3.4–5)
ALBUMIN/GLOB SERPL: 0.9 {RATIO} (ref 0.8–1.7)
ALP SERPL-CCNC: 162 U/L (ref 45–117)
ALT SERPL-CCNC: 18 U/L (ref 13–56)
ANION GAP SERPL CALC-SCNC: 7 MMOL/L (ref 3–18)
APPEARANCE UR: CLEAR
AST SERPL-CCNC: 29 U/L (ref 15–37)
BASOPHILS # BLD: 0.1 K/UL (ref 0–0.1)
BASOPHILS NFR BLD: 1 % (ref 0–2)
BILIRUB DIRECT SERPL-MCNC: <0.1 MG/DL (ref 0–0.2)
BILIRUB SERPL-MCNC: 0.2 MG/DL (ref 0.2–1)
BILIRUB UR QL: NEGATIVE
BUN SERPL-MCNC: 6 MG/DL (ref 7–18)
BUN/CREAT SERPL: 8 (ref 12–20)
C DIFF GDH STL QL: NEGATIVE
C DIFF TOX A+B STL QL IA: NEGATIVE
CALCIUM SERPL-MCNC: 9 MG/DL (ref 8.5–10.1)
CHLORIDE SERPL-SCNC: 107 MMOL/L (ref 100–108)
CO2 SERPL-SCNC: 28 MMOL/L (ref 21–32)
COLOR UR: YELLOW
CREAT SERPL-MCNC: 0.8 MG/DL (ref 0.6–1.3)
DIFFERENTIAL METHOD BLD: ABNORMAL
EOSINOPHIL # BLD: 0.1 K/UL (ref 0–0.4)
EOSINOPHIL NFR BLD: 2 % (ref 0–5)
ERYTHROCYTE [DISTWIDTH] IN BLOOD BY AUTOMATED COUNT: 12.8 % (ref 11.6–14.5)
GLOBULIN SER CALC-MCNC: 3.9 G/DL (ref 2–4)
GLUCOSE SERPL-MCNC: 94 MG/DL (ref 74–99)
GLUCOSE UR STRIP.AUTO-MCNC: NEGATIVE MG/DL
HCG UR QL: NEGATIVE
HCT VFR BLD AUTO: 42.9 % (ref 35–45)
HGB BLD-MCNC: 14.6 G/DL (ref 12–16)
HGB UR QL STRIP: NEGATIVE
INTERPRETATION: NORMAL
KETONES UR QL STRIP.AUTO: NEGATIVE MG/DL
LEUKOCYTE ESTERASE UR QL STRIP.AUTO: NEGATIVE
LIPASE SERPL-CCNC: 178 U/L (ref 73–393)
LYMPHOCYTES # BLD: 2.8 K/UL (ref 0.9–3.6)
LYMPHOCYTES NFR BLD: 39 % (ref 21–52)
MCH RBC QN AUTO: 29.4 PG (ref 24–34)
MCHC RBC AUTO-ENTMCNC: 34 G/DL (ref 31–37)
MCV RBC AUTO: 86.3 FL (ref 74–97)
MONOCYTES # BLD: 0.6 K/UL (ref 0.05–1.2)
MONOCYTES NFR BLD: 9 % (ref 3–10)
NEUTS SEG # BLD: 3.5 K/UL (ref 1.8–8)
NEUTS SEG NFR BLD: 49 % (ref 40–73)
NITRITE UR QL STRIP.AUTO: NEGATIVE
PH UR STRIP: 5.5 [PH] (ref 5–8)
PLATELET # BLD AUTO: 313 K/UL (ref 135–420)
PMV BLD AUTO: 9.1 FL (ref 9.2–11.8)
POTASSIUM SERPL-SCNC: 4.6 MMOL/L (ref 3.5–5.5)
PROT SERPL-MCNC: 7.6 G/DL (ref 6.4–8.2)
PROT UR STRIP-MCNC: NEGATIVE MG/DL
RBC # BLD AUTO: 4.97 M/UL (ref 4.2–5.3)
SODIUM SERPL-SCNC: 142 MMOL/L (ref 136–145)
SP GR UR REFRACTOMETRY: 1.02 (ref 1–1.03)
UROBILINOGEN UR QL STRIP.AUTO: 0.2 EU/DL (ref 0.2–1)
WBC # BLD AUTO: 7.1 K/UL (ref 4.6–13.2)

## 2019-06-07 PROCEDURE — 74011636320 HC RX REV CODE- 636/320: Performed by: EMERGENCY MEDICINE

## 2019-06-07 PROCEDURE — 74177 CT ABD & PELVIS W/CONTRAST: CPT

## 2019-06-07 PROCEDURE — 74011250636 HC RX REV CODE- 250/636: Performed by: PHYSICIAN ASSISTANT

## 2019-06-07 PROCEDURE — 80048 BASIC METABOLIC PNL TOTAL CA: CPT

## 2019-06-07 PROCEDURE — 87449 NOS EACH ORGANISM AG IA: CPT

## 2019-06-07 PROCEDURE — 85025 COMPLETE CBC W/AUTO DIFF WBC: CPT

## 2019-06-07 PROCEDURE — 87045 FECES CULTURE AEROBIC BACT: CPT

## 2019-06-07 PROCEDURE — 81025 URINE PREGNANCY TEST: CPT

## 2019-06-07 PROCEDURE — 81003 URINALYSIS AUTO W/O SCOPE: CPT

## 2019-06-07 PROCEDURE — 99283 EMERGENCY DEPT VISIT LOW MDM: CPT

## 2019-06-07 PROCEDURE — 96361 HYDRATE IV INFUSION ADD-ON: CPT

## 2019-06-07 PROCEDURE — 74011250637 HC RX REV CODE- 250/637: Performed by: PHYSICIAN ASSISTANT

## 2019-06-07 PROCEDURE — 96374 THER/PROPH/DIAG INJ IV PUSH: CPT

## 2019-06-07 PROCEDURE — 80076 HEPATIC FUNCTION PANEL: CPT

## 2019-06-07 PROCEDURE — 83690 ASSAY OF LIPASE: CPT

## 2019-06-07 RX ORDER — SODIUM CHLORIDE 9 MG/ML
1000 INJECTION, SOLUTION INTRAVENOUS ONCE
Status: COMPLETED | OUTPATIENT
Start: 2019-06-07 | End: 2019-06-07

## 2019-06-07 RX ORDER — ONDANSETRON 2 MG/ML
4 INJECTION INTRAMUSCULAR; INTRAVENOUS
Status: COMPLETED | OUTPATIENT
Start: 2019-06-07 | End: 2019-06-07

## 2019-06-07 RX ORDER — AZITHROMYCIN 250 MG/1
1000 TABLET, FILM COATED ORAL
Status: COMPLETED | OUTPATIENT
Start: 2019-06-07 | End: 2019-06-07

## 2019-06-07 RX ORDER — ONDANSETRON 4 MG/1
TABLET, ORALLY DISINTEGRATING ORAL
Qty: 10 TAB | Refills: 0 | OUTPATIENT
Start: 2019-06-07 | End: 2019-11-18

## 2019-06-07 RX ORDER — DICYCLOMINE HYDROCHLORIDE 10 MG/1
10 CAPSULE ORAL 4 TIMES DAILY
Qty: 20 CAP | Refills: 0 | Status: SHIPPED | OUTPATIENT
Start: 2019-06-07 | End: 2019-06-12

## 2019-06-07 RX ADMIN — SODIUM CHLORIDE 1000 ML: 900 INJECTION, SOLUTION INTRAVENOUS at 13:31

## 2019-06-07 RX ADMIN — IOPAMIDOL 97 ML: 612 INJECTION, SOLUTION INTRAVENOUS at 14:56

## 2019-06-07 RX ADMIN — AZITHROMYCIN MONOHYDRATE 1000 MG: 250 TABLET ORAL at 16:33

## 2019-06-07 RX ADMIN — ONDANSETRON 4 MG: 2 INJECTION INTRAMUSCULAR; INTRAVENOUS at 15:03

## 2019-06-07 NOTE — ED TRIAGE NOTES
Onset of symptom Sunday, Patient states she was eating without her dentures, \"I think I have an infections in my stomach\", patient states this pain feels like the pain she had when diagnoses with colitis

## 2019-06-07 NOTE — LETTER
NOTIFICATION OF RETURN TO WORK 
 
6/7/2019 4:31 PM 
 
Ms. Bailey Munoz [phone] [id number] [facility] To Whom It May Concern: 
 
Laurence Connors was under the care of [address] [place] [address] EMERGENCY DEPT. She will be able to return to work on Monday, 6/10/19. If there are questions or concerns please have the patient contact our office. Sincerely, Coby Hill PA-C

## 2019-06-07 NOTE — DISCHARGE INSTRUCTIONS
Patient Education      Please return immediately to the Emergency Room for re-evaluation if you are not improving, develop any new symptoms, or develop worsening of current symptoms! If you have been prescribed a medication and are unable to take this medication for any reason, please return to the Emergency Department for further evaluation! If you have been referred for follow-up to a specialist, but are unable to follow-up and your symptoms are either not improving or are worsening, please return to the Emergency Department for further evaluation! Colitis: Care Instructions  Your Care Instructions  Colitis is the medical term for swelling (inflammation) of the intestine. It can be caused by different things, such as an infection or loss of blood flow in the intestine. Other causes are problems like Crohn's disease or ulcerative colitis. Symptoms may include fever, diarrhea that may be bloody, or belly pain. Sometimes symptoms go away without treatment. But you may need treatment or more tests, such as blood tests or a stool test. Or you may need imaging tests like a CT scan or a colonoscopy. In some cases, the doctor may want to test a sample of tissue from the intestine. This test is called a biopsy. The doctor has checked you carefully, but problems can develop later. If you notice any problems or new symptoms, get medical treatment right away. Follow-up care is a key part of your treatment and safety. Be sure to make and go to all appointments, and call your doctor if you are having problems. It's also a good idea to know your test results and keep a list of the medicines you take. How can you care for yourself at home? · Rest until you feel better. · Your doctor may recommend that you eat bland foods. These include rice, dry toast or crackers, bananas, and applesauce. · To prevent dehydration, drink plenty of fluids. Choose water and other caffeine-free clear liquids until you feel better. If you have kidney, heart, or liver disease and have to limit fluids, talk with your doctor before you increase the amount of fluids you drink. · Be safe with medicines. Take your medicines exactly as prescribed. Call your doctor if you think you are having a problem with your medicine. You will get more details on the specific medicines your doctor prescribes. When should you call for help? Call 911 anytime you think you may need emergency care. For example, call if:    · You passed out (lost consciousness).     · Your stools are maroon or very bloody.    Call your doctor now or seek immediate medical care if:    · You have new or worse belly pain.     · You have a fever.     · You are vomiting.     · You cannot pass stools or gas.     · You have new or more blood in your stools.    Watch closely for changes in your health, and be sure to contact your doctor if:    · You have new or worse symptoms.     · You are losing weight.     · You do not get better as expected. Where can you learn more? Go to http://obie-bob.info/. Eric Buckner in the search box to learn more about \"Colitis: Care Instructions. \"  Current as of: March 27, 2018  Content Version: 11.9  © 8527-9604 [employer]. Care instructions adapted under license by [employer] (which disclaims liability or warranty for this information). If you have questions about a medical condition or this instruction, always ask your healthcare professional. [place] [address] any warranty or liability for your use of this information. Patient Education        Gastroenteritis: Care Instructions  Your Care Instructions    Gastroenteritis is an illness that may cause nausea, vomiting, and diarrhea. It is sometimes called \"stomach flu. \" It can be caused by bacteria or a virus. You will probably begin to feel better in 1 to 2 days.  In the meantime, get plenty of rest and make sure you do not become dehydrated. Dehydration occurs when your body loses too much fluid. Follow-up care is a key part of your treatment and safety. Be sure to make and go to all appointments, and call your doctor if you are having problems. It's also a good idea to know your test results and keep a list of the medicines you take. How can you care for yourself at home? · If your doctor prescribed antibiotics, take them as directed. Do not stop taking them just because you feel better. You need to take the full course of antibiotics. · Drink plenty of fluids to prevent dehydration, enough so that your urine is light yellow or clear like water. Choose water and other caffeine-free clear liquids until you feel better. If you have kidney, heart, or liver disease and have to limit fluids, talk with your doctor before you increase your fluid intake. · Drink fluids slowly, in frequent, small amounts, because drinking too much too fast can cause vomiting. · Begin eating mild foods, such as dry toast, yogurt, applesauce, bananas, and rice. Avoid spicy, hot, or high-fat foods, and do not drink alcohol or caffeine for a day or two. Do not drink milk or eat ice cream until you are feeling better. How to prevent gastroenteritis  · Keep hot foods hot and cold foods cold. · Do not eat meats, dressings, salads, or other foods that have been kept at room temperature for more than 2 hours. · Use a thermometer to check your refrigerator. It should be between 34°F and 40°F.  · Defrost meats in the refrigerator or microwave, not on the kitchen counter. · Keep your hands and your kitchen clean. Wash your hands, cutting boards, and countertops with hot soapy water frequently. · Cook meat until it is well done. · Do not eat raw eggs or uncooked sauces made with raw eggs. · Do not take chances. If food looks or tastes spoiled, throw it out. When should you call for help? Call 911 anytime you think you may need emergency care.  For example, call if:    · You vomit blood or what looks like coffee grounds.     · You passed out (lost consciousness).     · You pass maroon or very bloody stools.    Call your doctor now or seek immediate medical care if:    · You have severe belly pain.     · You have signs of needing more fluids. You have sunken eyes, a dry mouth, and pass only a little dark urine.     · You feel like you are going to faint.     · You have increased belly pain that does not go away in 1 to 2 days.     · You have new or increased nausea, or you are vomiting.     · You have a new or higher fever.     · Your stools are black and tarlike or have streaks of blood.    Watch closely for changes in your health, and be sure to contact your doctor if:    · You are dizzy or lightheaded.     · You urinate less than usual, or your urine is dark yellow or brown.     · You do not feel better with each day that goes by. Where can you learn more? Go to http://obie-bob.info/. Enter N142 in the search box to learn more about \"Gastroenteritis: Care Instructions. \"  Current as of: July 30, 2018  Content Version: 11.9  © 6634-1654 [employer]. Care instructions adapted under license by [employer] (which disclaims liability or warranty for this information). If you have questions about a medical condition or this instruction, always ask your healthcare professional. [facility] any warranty or liability for your use of this information.

## 2019-06-07 NOTE — ED PROVIDER NOTES
EMERGENCY DEPARTMENT HISTORY AND PHYSICAL EXAM    3:33 PM      Date: 6/7/2019  Patient Name: Bony Garcia    History of Presenting Illness     Chief Complaint   Patient presents with    Abdominal Pain    Vomiting    Diarrhea    Nausea         History Provided By: Patient    Chief Complaint: generalized abdominal pain, nausea/vomiting,diarrhea  Duration: 5 Days  Timing:  Acute  Location:   Quality: Cramping  Severity: 4 out of 10  Modifying Factors: none  Associated Symptoms: denies any other associated signs or symptoms      Additional History (Context):Norma Florian is a 53 y.o. female with a pertinent history of HTN who presents to the emergency department for evaluation of generalized abdominal pain, nausea, vomiting, and diarrhea x5 days. Patient reports that she was eating without her dentures on Sunday, which will occasionally cause abdominal pain for her. Patient states diarrhea developed yesterday, occurring 10 times. Today she has had approximately 5 episodes. No recent antibiotic use, consumption of raw or undercooked food, or ill contacts. Patient reports fever of 101 °F 2 days ago. No possibility of pregnancy. No urinary symptoms. No URI symptoms, cough, chest pain, or shortness of breath. Patient denies any rashes. PCP:  Gari Habermann, MD      Current Outpatient Medications   Medication Sig Dispense Refill    ondansetron (ZOFRAN ODT) 4 mg disintegrating tablet Take 1-2 tablets every 6-8 hours as needed for nausea and vomiting. 10 Tab 0    dicyclomine (BENTYL) 10 mg capsule Take 1 Cap by mouth four (4) times daily for 5 days. 20 Cap 0    alum-mag hydroxide-simeth (MYLANTA) 200-200-20 mg/5 mL susp Take 30 mL by mouth four (4) times daily as needed for Pain. 200 mL 0    acetaminophen (TYLENOL) 325 mg tablet Take 2 Tabs by mouth every four (4) hours as needed for Pain. 20 Tab 0    ibuprofen (MOTRIN) 200 mg tablet Take 200 mg by mouth as needed for Pain.          Past History     Past Medical History:  Past Medical History:   Diagnosis Date    Hypertension     Murmur, cardiac        Past Surgical History:  Past Surgical History:   Procedure Laterality Date    HX BREAST BIOPSY      biopsy       Family History:  History reviewed. No pertinent family history. Social History:  Social History     Tobacco Use    Smoking status: Current Every Day Smoker     Packs/day: 0.50    Smokeless tobacco: Never Used   Substance Use Topics    Alcohol use: Yes     Comment: rarely    Drug use: No       Allergies: Allergies   Allergen Reactions    Dextromethorphan Rash    Penicillins Rash         Review of Systems       Review of Systems   Constitutional: Negative for chills and fever. HENT: Negative for congestion, rhinorrhea and sore throat. Respiratory: Negative for cough and shortness of breath. Cardiovascular: Negative for chest pain. Gastrointestinal: Positive for abdominal pain, diarrhea, nausea and vomiting. Negative for blood in stool and constipation. Genitourinary: Negative for dysuria, frequency and hematuria. Musculoskeletal: Negative for back pain and myalgias. Skin: Negative for rash and wound. Neurological: Negative for dizziness and headaches. All other systems reviewed and are negative. Physical Exam     Visit Vitals  /82 (BP 1 Location: Right arm, BP Patient Position: At rest;Sitting)   Pulse 89   Temp 98 °F (36.7 °C)   Resp 16   Ht 5' 3\" (1.6 m)   Wt 83.9 kg (185 lb)   LMP  (LMP Unknown)   SpO2 99%   BMI 32.77 kg/m²         Physical Exam   Constitutional: She is oriented to person, place, and time. She appears well-developed and well-nourished. No distress. HENT:   Head: Normocephalic and atraumatic. Nose: Nose normal.   Mouth/Throat: Oropharynx is clear and moist. No oropharyngeal exudate. Eyes: Conjunctivae are normal.   Neck: Normal range of motion. Neck supple. No thyromegaly present.    Cardiovascular: Normal rate, regular rhythm and normal heart sounds. Pulmonary/Chest: Effort normal and breath sounds normal. No respiratory distress. She has no wheezes. She has no rales. She exhibits no tenderness. Abdominal: Soft. Bowel sounds are normal. She exhibits no distension. There is tenderness. There is no rebound and no guarding. Mildly tender to palpation of epigastric and suprapubic regions. No tenderness upon palpation of right upper quadrant, right lower quadrant, left upper quadrant, and left lower quadrant. Negative Maciel's sign. No guarding or rebound. Normoactive bowel sounds all quadrants. Musculoskeletal: She exhibits no edema or deformity. Lymphadenopathy:     She has no cervical adenopathy. Neurological: She is alert and oriented to person, place, and time. She has normal reflexes. Skin: Skin is warm and dry. She is not diaphoretic. Psychiatric: She has a normal mood and affect. Nursing note and vitals reviewed.       Diagnostic Study Results     Labs -  Recent Results (from the past 12 hour(s))   URINALYSIS W/ RFLX MICROSCOPIC    Collection Time: 06/07/19  1:00 PM   Result Value Ref Range    Color YELLOW      Appearance CLEAR      Specific gravity 1.018 1.005 - 1.030      pH (UA) 5.5 5.0 - 8.0      Protein NEGATIVE  NEG mg/dL    Glucose NEGATIVE  NEG mg/dL    Ketone NEGATIVE  NEG mg/dL    Bilirubin NEGATIVE  NEG      Blood NEGATIVE  NEG      Urobilinogen 0.2 0.2 - 1.0 EU/dL    Nitrites NEGATIVE  NEG      Leukocyte Esterase NEGATIVE  NEG     HCG URINE, QL    Collection Time: 06/07/19  1:00 PM   Result Value Ref Range    HCG urine, QL NEGATIVE  NEG     CBC WITH AUTOMATED DIFF    Collection Time: 06/07/19  1:16 PM   Result Value Ref Range    WBC 7.1 4.6 - 13.2 K/uL    RBC 4.97 4.20 - 5.30 M/uL    HGB 14.6 12.0 - 16.0 g/dL    HCT 42.9 35.0 - 45.0 %    MCV 86.3 74.0 - 97.0 FL    MCH 29.4 24.0 - 34.0 PG    MCHC 34.0 31.0 - 37.0 g/dL    RDW 12.8 11.6 - 14.5 %    PLATELET 155 446 - 062 K/uL    MPV 9.1 (L) 9.2 - 11.8 FL    NEUTROPHILS 49 40 - 73 %    LYMPHOCYTES 39 21 - 52 %    MONOCYTES 9 3 - 10 %    EOSINOPHILS 2 0 - 5 %    BASOPHILS 1 0 - 2 %    ABS. NEUTROPHILS 3.5 1.8 - 8.0 K/UL    ABS. LYMPHOCYTES 2.8 0.9 - 3.6 K/UL    ABS. MONOCYTES 0.6 0.05 - 1.2 K/UL    ABS. EOSINOPHILS 0.1 0.0 - 0.4 K/UL    ABS. BASOPHILS 0.1 0.0 - 0.1 K/UL    DF AUTOMATED     METABOLIC PANEL, BASIC    Collection Time: 06/07/19  1:16 PM   Result Value Ref Range    Sodium 142 136 - 145 mmol/L    Potassium 4.6 3.5 - 5.5 mmol/L    Chloride 107 100 - 108 mmol/L    CO2 28 21 - 32 mmol/L    Anion gap 7 3.0 - 18 mmol/L    Glucose 94 74 - 99 mg/dL    BUN 6 (L) 7.0 - 18 MG/DL    Creatinine 0.80 0.6 - 1.3 MG/DL    BUN/Creatinine ratio 8 (L) 12 - 20      GFR est AA >60 >60 ml/min/1.73m2    GFR est non-AA >60 >60 ml/min/1.73m2    Calcium 9.0 8.5 - 10.1 MG/DL   HEPATIC FUNCTION PANEL    Collection Time: 06/07/19  1:16 PM   Result Value Ref Range    Protein, total 7.6 6.4 - 8.2 g/dL    Albumin 3.7 3.4 - 5.0 g/dL    Globulin 3.9 2.0 - 4.0 g/dL    A-G Ratio 0.9 0.8 - 1.7      Bilirubin, total 0.2 0.2 - 1.0 MG/DL    Bilirubin, direct <0.1 0.0 - 0.2 MG/DL    Alk. phosphatase 162 (H) 45 - 117 U/L    AST (SGOT) 29 15 - 37 U/L    ALT (SGPT) 18 13 - 56 U/L   LIPASE    Collection Time: 06/07/19  1:16 PM   Result Value Ref Range    Lipase 178 73 - 393 U/L       Radiologic Studies -   Ct Abd Pelv W Cont    Result Date: 6/7/2019  EXAM: CT of the abdomen and pelvis HISTORY: -From Provider:Abd pain, fever, no recent surgery -Additional: None COMPARISON: 04/16/19 TECHNIQUE: Axial CT imaging of the abdomen and pelvis was performed with intravenous contrast.  Oral contrast was not administered, limiting sensitivity for detection of bowel abnormalities. Multiplanar reformats were generated. One or more dose reduction techniques were used on this CT: automated exposure control, adjustment of the mAs and/or kVp according to patient size, and iterative reconstruction techniques. The specific techniques used on this CT exam have been documented in the patient's electronic medical record. Digital Imaging and Communications in Medicine (DICOM) format image data are available to nonaffiliated external healthcare facilities or entities on a secure, media free, reciprocally searchable basis with patient authorization for at least a 12-month period after this study. FINDINGS: LOWER CHEST: Unremarkable. LIVER: Liver is normal.   BILIARY: Gallbladder: No calcified gallstones or surrounding inflammatory changes identified. No biliary ductal dilation. PANCREAS: Normal. SPLEEN: Normal. Accessory splenule. ADRENALS: Normal. KIDNEYS/URETERS: Normal. LYMPH NODES: No enlarged lymph nodes. VASCULATURE: Aorta unremarkable. Iliac arterial calcifications. GASTROINTESTINAL TRACT: Esophagus/stomach/duodenum: unremarkable. Appendix: Not identified. Small/Large bowel: Although underdistended findings suspicious for mural thickening in the ascending and transverse colon. Moderate stool burden in the descending colon with minimal, if any thickening. Suspicion of mural thickening in the distal duodenum and proximal jejunum. PELVIC ORGANS: Bladder: Mild bladder wall thickening. 3.5 x 3.7 x 3.4 cm left posterior exophytic uterine lesion, likely fibroid. OTHER: No intraperitoneal free fluid or free air. BONES: No acute or aggressive osseous abnormalities identified. _______________     IMPRESSION: 1. Although underdistended, findings suspicious for ascending and transverse colonic mural thickening/colitis. Additional thickening of the distal duodenum/proximal jejunum of the DJ flexure, which can reflect enteritis. 2. Fibroid uterus. 3. Mild bladder wall thickening. This can be seen in the setting of incomplete distention, infection or bladder outlet obstruction. Urinalysis correlation could be done if clinically warranted. Medical Decision Making   I am the first provider for this patient.     I reviewed the vital signs, available nursing notes, past medical history, past surgical history, family history and social history. Vital Signs-Reviewed the patient's vital signs. Pulse Oximetry Analysis -  99% on room air (Interpretation)    Records Reviewed: Nursing Notes and Old Medical Records (Time of Review: 3:33 PM)    ED Course: Progress Notes, Reevaluation, and Consults:      Provider Notes (Medical Decision Making):   differential diagnosis: GERD, gastritis, gastroenteritis, peptic ulcer disease, hepatitis, cholecystitis    Plan: Patient presents ambulatory in no significant distress with normal vitals. Exam reveals mild epigastric and suprapubic tenderness to palpation without peritoneal signs. 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 Labs today are reassuring with the exception of a mildly elevated alk phos, which is stable compared to April 2019. CT abdomen pelvis reveals ascending and transverse colitis. UA negative. Given history of fever 2 days ago, will treat with empiric antibiotics. 1 g azithromycin given here. Will discharge home with Bentyl and Zofran. Patient is advised to follow-up with gastroenterology. At this time, patient is stable and appropriate for discharge home. Patient demonstrates understanding of current diagnoses and is in agreement with the treatment plan. They are advised that while the likelihood of serious underlying condition is low at this point given the evaluation performed today, we cannot fully rule it out. They are advised to immediately return with any new symptoms or worsening of current condition. All questions have been answered. Patient is given educational material regarding their diagnoses, including danger symptoms and when to return to the ED. Diagnosis     Clinical Impression:   1.  Colitis        Disposition: DC Home    Follow-up Information     Follow up With Specialties Details Why Contact Info    Mei Lea MD Gastroenterology Call For follow-up [address] [place] [id number] [facility]       [facility] EMERGENCY DEPT Emergency Medicine Go to As needed, If symptoms worsen [address]  [phone]           Patient's Medications   Start Taking    DICYCLOMINE (BENTYL) 10 MG CAPSULE    Take 1 Cap by mouth four (4) times daily for 5 days. ONDANSETRON (ZOFRAN ODT) 4 MG DISINTEGRATING TABLET    Take 1-2 tablets every 6-8 hours as needed for nausea and vomiting. Continue Taking    ACETAMINOPHEN (TYLENOL) 325 MG TABLET    Take 2 Tabs by mouth every four (4) hours as needed for Pain. ALUM-MAG HYDROXIDE-SIMETH (MYLANTA) 200-200-20 MG/5 ML SUSP    Take 30 mL by mouth four (4) times daily as needed for Pain. IBUPROFEN (MOTRIN) 200 MG TABLET    Take 200 mg by mouth as needed for Pain.    These Medications have changed    No medications on file   Stop Taking    No medications on file     _______________________________

## 2019-06-09 LAB
BACTERIA SPEC CULT: NORMAL
SERVICE CMNT-IMP: NORMAL

## 2019-11-18 ENCOUNTER — APPOINTMENT (OUTPATIENT)
Dept: CT IMAGING | Age: 49
End: 2019-11-18
Attending: PHYSICIAN ASSISTANT
Payer: COMMERCIAL

## 2019-11-18 ENCOUNTER — HOSPITAL ENCOUNTER (EMERGENCY)
Age: 49
Discharge: HOME | End: 2019-11-18
Attending: EMERGENCY MEDICINE
Payer: COMMERCIAL

## 2019-11-18 VITALS
SYSTOLIC BLOOD PRESSURE: 145 MMHG | RESPIRATION RATE: 16 BRPM | BODY MASS INDEX: 31.89 KG/M2 | HEART RATE: 76 BPM | DIASTOLIC BLOOD PRESSURE: 90 MMHG | OXYGEN SATURATION: 98 % | WEIGHT: 180 LBS | TEMPERATURE: 98 F | HEIGHT: 63 IN

## 2019-11-18 DIAGNOSIS — K52.9 GASTROENTERITIS: Primary | ICD-10-CM

## 2019-11-18 LAB
ALBUMIN SERPL-MCNC: 3.8 G/DL (ref 3.4–5)
ALBUMIN/GLOB SERPL: 1 {RATIO} (ref 0.8–1.7)
ALP SERPL-CCNC: 141 U/L (ref 45–117)
ALT SERPL-CCNC: 20 U/L (ref 13–56)
ANION GAP SERPL CALC-SCNC: 1 MMOL/L (ref 3–18)
APPEARANCE UR: CLEAR
AST SERPL-CCNC: 17 U/L (ref 10–38)
BASOPHILS # BLD: 0.1 K/UL (ref 0–0.1)
BASOPHILS NFR BLD: 1 % (ref 0–2)
BILIRUB SERPL-MCNC: 0.3 MG/DL (ref 0.2–1)
BILIRUB UR QL: NEGATIVE
BUN SERPL-MCNC: 5 MG/DL (ref 7–18)
BUN/CREAT SERPL: 5 (ref 12–20)
CALCIUM SERPL-MCNC: 9.3 MG/DL (ref 8.5–10.1)
CHLORIDE SERPL-SCNC: 108 MMOL/L (ref 100–111)
CO2 SERPL-SCNC: 32 MMOL/L (ref 21–32)
COLOR UR: YELLOW
CREAT SERPL-MCNC: 0.91 MG/DL (ref 0.6–1.3)
DIFFERENTIAL METHOD BLD: ABNORMAL
EOSINOPHIL # BLD: 0.1 K/UL (ref 0–0.4)
EOSINOPHIL NFR BLD: 1 % (ref 0–5)
ERYTHROCYTE [DISTWIDTH] IN BLOOD BY AUTOMATED COUNT: 12.9 % (ref 11.6–14.5)
GLOBULIN SER CALC-MCNC: 3.7 G/DL (ref 2–4)
GLUCOSE SERPL-MCNC: 91 MG/DL (ref 74–99)
GLUCOSE UR STRIP.AUTO-MCNC: NEGATIVE MG/DL
HCT VFR BLD AUTO: 43.5 % (ref 35–45)
HGB BLD-MCNC: 14.8 G/DL (ref 12–16)
HGB UR QL STRIP: NEGATIVE
KETONES UR QL STRIP.AUTO: NEGATIVE MG/DL
LEUKOCYTE ESTERASE UR QL STRIP.AUTO: NEGATIVE
LIPASE SERPL-CCNC: 127 U/L (ref 73–393)
LYMPHOCYTES # BLD: 3 K/UL (ref 0.9–3.6)
LYMPHOCYTES NFR BLD: 38 % (ref 21–52)
MCH RBC QN AUTO: 29.6 PG (ref 24–34)
MCHC RBC AUTO-ENTMCNC: 34 G/DL (ref 31–37)
MCV RBC AUTO: 87 FL (ref 74–97)
MONOCYTES # BLD: 0.7 K/UL (ref 0.05–1.2)
MONOCYTES NFR BLD: 9 % (ref 3–10)
NEUTS SEG # BLD: 4.1 K/UL (ref 1.8–8)
NEUTS SEG NFR BLD: 51 % (ref 40–73)
NITRITE UR QL STRIP.AUTO: NEGATIVE
PH UR STRIP: 6 [PH] (ref 5–8)
PLATELET # BLD AUTO: 319 K/UL (ref 135–420)
PMV BLD AUTO: 9.1 FL (ref 9.2–11.8)
POTASSIUM SERPL-SCNC: 4.1 MMOL/L (ref 3.5–5.5)
PROT SERPL-MCNC: 7.5 G/DL (ref 6.4–8.2)
PROT UR STRIP-MCNC: NEGATIVE MG/DL
RBC # BLD AUTO: 5 M/UL (ref 4.2–5.3)
SODIUM SERPL-SCNC: 141 MMOL/L (ref 136–145)
SP GR UR REFRACTOMETRY: 1.01 (ref 1–1.03)
UROBILINOGEN UR QL STRIP.AUTO: 0.2 EU/DL (ref 0.2–1)
WBC # BLD AUTO: 8 K/UL (ref 4.6–13.2)

## 2019-11-18 PROCEDURE — 74177 CT ABD & PELVIS W/CONTRAST: CPT

## 2019-11-18 PROCEDURE — 74011636320 HC RX REV CODE- 636/320: Performed by: EMERGENCY MEDICINE

## 2019-11-18 PROCEDURE — 80053 COMPREHEN METABOLIC PANEL: CPT

## 2019-11-18 PROCEDURE — 96374 THER/PROPH/DIAG INJ IV PUSH: CPT

## 2019-11-18 PROCEDURE — 83690 ASSAY OF LIPASE: CPT

## 2019-11-18 PROCEDURE — 99283 EMERGENCY DEPT VISIT LOW MDM: CPT

## 2019-11-18 PROCEDURE — 81003 URINALYSIS AUTO W/O SCOPE: CPT

## 2019-11-18 PROCEDURE — 96361 HYDRATE IV INFUSION ADD-ON: CPT

## 2019-11-18 PROCEDURE — 85025 COMPLETE CBC W/AUTO DIFF WBC: CPT

## 2019-11-18 PROCEDURE — 74011250636 HC RX REV CODE- 250/636: Performed by: PHYSICIAN ASSISTANT

## 2019-11-18 RX ORDER — ONDANSETRON 2 MG/ML
4 INJECTION INTRAMUSCULAR; INTRAVENOUS
Status: COMPLETED | OUTPATIENT
Start: 2019-11-18 | End: 2019-11-18

## 2019-11-18 RX ORDER — HYDROCORTISONE 1 %
CREAM (GRAM) TOPICAL 2 TIMES DAILY
Qty: 30 G | Refills: 0 | Status: SHIPPED | OUTPATIENT
Start: 2019-11-18 | End: 2020-10-27

## 2019-11-18 RX ORDER — HYDROCORTISONE 1 %
CREAM (GRAM) TOPICAL 2 TIMES DAILY
Qty: 30 G | Refills: 0 | Status: SHIPPED | OUTPATIENT
Start: 2019-11-18 | End: 2019-11-18

## 2019-11-18 RX ORDER — ONDANSETRON 4 MG/1
4 TABLET, ORALLY DISINTEGRATING ORAL
Qty: 10 TAB | Refills: 0 | Status: SHIPPED | OUTPATIENT
Start: 2019-11-18 | End: 2020-10-27

## 2019-11-18 RX ADMIN — IOPAMIDOL 97 ML: 612 INJECTION, SOLUTION INTRAVENOUS at 13:47

## 2019-11-18 RX ADMIN — SODIUM CHLORIDE 1000 ML: 900 INJECTION, SOLUTION INTRAVENOUS at 12:15

## 2019-11-18 RX ADMIN — ONDANSETRON 4 MG: 2 INJECTION INTRAMUSCULAR; INTRAVENOUS at 12:15

## 2019-11-18 NOTE — DISCHARGE INSTRUCTIONS
Patient Education        Gastroenteritis: Care Instructions  Your Care Instructions    Gastroenteritis is an illness that may cause nausea, vomiting, and diarrhea. It is sometimes called \"stomach flu. \" It can be caused by bacteria or a virus. You will probably begin to feel better in 1 to 2 days. In the meantime, get plenty of rest and make sure you do not become dehydrated. Dehydration occurs when your body loses too much fluid. Follow-up care is a key part of your treatment and safety. Be sure to make and go to all appointments, and call your doctor if you are having problems. It's also a good idea to know your test results and keep a list of the medicines you take. How can you care for yourself at home? · If your doctor prescribed antibiotics, take them as directed. Do not stop taking them just because you feel better. You need to take the full course of antibiotics. · Drink plenty of fluids to prevent dehydration, enough so that your urine is light yellow or clear like water. Choose water and other caffeine-free clear liquids until you feel better. If you have kidney, heart, or liver disease and have to limit fluids, talk with your doctor before you increase your fluid intake. · Drink fluids slowly, in frequent, small amounts, because drinking too much too fast can cause vomiting. · Begin eating mild foods, such as dry toast, yogurt, applesauce, bananas, and rice. Avoid spicy, hot, or high-fat foods, and do not drink alcohol or caffeine for a day or two. Do not drink milk or eat ice cream until you are feeling better. How to prevent gastroenteritis  · Keep hot foods hot and cold foods cold. · Do not eat meats, dressings, salads, or other foods that have been kept at room temperature for more than 2 hours. · Use a thermometer to check your refrigerator. It should be between 34°F and 40°F.  · Defrost meats in the refrigerator or microwave, not on the kitchen counter.   · Keep your hands and your kitchen clean. Wash your hands, cutting boards, and countertops with hot soapy water frequently. · Cook meat until it is well done. · Do not eat raw eggs or uncooked sauces made with raw eggs. · Do not take chances. If food looks or tastes spoiled, throw it out. When should you call for help? Call 911 anytime you think you may need emergency care. For example, call if:    · You vomit blood or what looks like coffee grounds.     · You passed out (lost consciousness).     · You pass maroon or very bloody stools.    Call your doctor now or seek immediate medical care if:    · You have severe belly pain.     · You have signs of needing more fluids. You have sunken eyes, a dry mouth, and pass only a little dark urine.     · You feel like you are going to faint.     · You have increased belly pain that does not go away in 1 to 2 days.     · You have new or increased nausea, or you are vomiting.     · You have a new or higher fever.     · Your stools are black and tarlike or have streaks of blood.    Watch closely for changes in your health, and be sure to contact your doctor if:    · You are dizzy or lightheaded.     · You urinate less than usual, or your urine is dark yellow or brown.     · You do not feel better with each day that goes by. Where can you learn more? Go to http://obie-bob.info/. Enter N142 in the search box to learn more about \"Gastroenteritis: Care Instructions. \"  Current as of: June 9, 2019  Content Version: 12.2  © 2697-0129 [employer]. Care instructions adapted under license by [employer] (which disclaims liability or warranty for this information). If you have questions about a medical condition or this instruction, always ask your healthcare professional. [place] [address] any warranty or liability for your use of this information.

## 2019-11-18 NOTE — ED PROVIDER NOTES
EMERGENCY DEPARTMENT HISTORY AND PHYSICAL EXAM      Date: 11/18/2019  Patient Name: James Melo    History of Presenting Illness     Chief Complaint   Patient presents with    Abdominal Pain       History Provided By: Patient    HPI: James Melo, 53 y.o. female PMHx significant for htn, diverticulitis, colitis presents ambulatory to the ED with cc of intermittent aching periumbilical and LLQ abdominal pain x 3 days with assoc multiple bouts of NBNB emesis and multiple bouts NB loose stool. Patient denies both emesis and diarrhea this morning. Patient reports continued intermittent aching abdominal pain. Patient reports history of diverticulitis and colitis. Denies fever/chills. Patient has been taking Pepto-Bismol without relief of symptoms. Rates pain 7 out of 10. Denies previous abdominal surgeries. There are no other complaints, changes, or physical findings at this time. PCP: None    No current facility-administered medications on file prior to encounter. Current Outpatient Medications on File Prior to Encounter   Medication Sig Dispense Refill    [DISCONTINUED] ondansetron (ZOFRAN ODT) 4 mg disintegrating tablet Take 1-2 tablets every 6-8 hours as needed for nausea and vomiting. 10 Tab 0    alum-mag hydroxide-simeth (MYLANTA) 200-200-20 mg/5 mL susp Take 30 mL by mouth four (4) times daily as needed for Pain. 200 mL 0    acetaminophen (TYLENOL) 325 mg tablet Take 2 Tabs by mouth every four (4) hours as needed for Pain. 20 Tab 0    ibuprofen (MOTRIN) 200 mg tablet Take 200 mg by mouth as needed for Pain. Past History     Past Medical History:  Past Medical History:   Diagnosis Date    Diverticulitis     Hypertension     Murmur, cardiac        Past Surgical History:  Past Surgical History:   Procedure Laterality Date    HX BREAST BIOPSY      biopsy       Family History:  History reviewed. No pertinent family history.     Social History:  Social History     Tobacco Use    Smoking status: Current Every Day Smoker     Packs/day: 0.50    Smokeless tobacco: Never Used   Substance Use Topics    Alcohol use: Yes     Comment: rarely    Drug use: No       Allergies: Allergies   Allergen Reactions    Dextromethorphan Rash    Penicillins Rash         Review of Systems   Review of Systems   Constitutional: Negative for chills and fever. Respiratory: Negative for shortness of breath. Cardiovascular: Negative for chest pain. Gastrointestinal: Positive for abdominal pain, diarrhea, nausea and vomiting. Genitourinary: Negative for flank pain. Musculoskeletal: Negative for back pain and myalgias. Skin: Negative for color change, pallor, rash and wound. Neurological: Negative for dizziness, weakness and light-headedness. All other systems reviewed and are negative. Physical Exam   Physical Exam   Constitutional: She is oriented to person, place, and time. She appears well-developed and well-nourished. No distress. HENT:   Head: Normocephalic and atraumatic. Eyes: Conjunctivae are normal.   Cardiovascular: Normal rate, regular rhythm and normal heart sounds. Pulmonary/Chest: Effort normal and breath sounds normal. No respiratory distress. Abdominal: Soft. Normal appearance and bowel sounds are normal. She exhibits no distension. There is tenderness in the periumbilical area and left lower quadrant. Abdomen nondistended  Normoactive bowel sounds  No RUQ or epigastric abd pain   Musculoskeletal: Normal range of motion. Neurological: She is alert and oriented to person, place, and time. Skin: Skin is warm. No rash noted. Psychiatric: She has a normal mood and affect. Her behavior is normal.   Nursing note and vitals reviewed.       Diagnostic Study Results     Labs -     Recent Results (from the past 12 hour(s))   URINALYSIS W/ RFLX MICROSCOPIC    Collection Time: 11/18/19 12:00 PM   Result Value Ref Range    Color YELLOW      Appearance CLEAR Specific gravity 1.009 1.005 - 1.030      pH (UA) 6.0 5.0 - 8.0      Protein NEGATIVE  NEG mg/dL    Glucose NEGATIVE  NEG mg/dL    Ketone NEGATIVE  NEG mg/dL    Bilirubin NEGATIVE  NEG      Blood NEGATIVE  NEG      Urobilinogen 0.2 0.2 - 1.0 EU/dL    Nitrites NEGATIVE  NEG      Leukocyte Esterase NEGATIVE  NEG     CBC WITH AUTOMATED DIFF    Collection Time: 11/18/19 12:11 PM   Result Value Ref Range    WBC 8.0 4.6 - 13.2 K/uL    RBC 5.00 4.20 - 5.30 M/uL    HGB 14.8 12.0 - 16.0 g/dL    HCT 43.5 35.0 - 45.0 %    MCV 87.0 74.0 - 97.0 FL    MCH 29.6 24.0 - 34.0 PG    MCHC 34.0 31.0 - 37.0 g/dL    RDW 12.9 11.6 - 14.5 %    PLATELET 570 736 - 393 K/uL    MPV 9.1 (L) 9.2 - 11.8 FL    NEUTROPHILS 51 40 - 73 %    LYMPHOCYTES 38 21 - 52 %    MONOCYTES 9 3 - 10 %    EOSINOPHILS 1 0 - 5 %    BASOPHILS 1 0 - 2 %    ABS. NEUTROPHILS 4.1 1.8 - 8.0 K/UL    ABS. LYMPHOCYTES 3.0 0.9 - 3.6 K/UL    ABS. MONOCYTES 0.7 0.05 - 1.2 K/UL    ABS. EOSINOPHILS 0.1 0.0 - 0.4 K/UL    ABS. BASOPHILS 0.1 0.0 - 0.1 K/UL    DF AUTOMATED     METABOLIC PANEL, COMPREHENSIVE    Collection Time: 11/18/19 12:11 PM   Result Value Ref Range    Sodium 141 136 - 145 mmol/L    Potassium 4.1 3.5 - 5.5 mmol/L    Chloride 108 100 - 111 mmol/L    CO2 32 21 - 32 mmol/L    Anion gap 1 (L) 3.0 - 18 mmol/L    Glucose 91 74 - 99 mg/dL    BUN 5 (L) 7.0 - 18 MG/DL    Creatinine 0.91 0.6 - 1.3 MG/DL    BUN/Creatinine ratio 5 (L) 12 - 20      GFR est AA >60 >60 ml/min/1.73m2    GFR est non-AA >60 >60 ml/min/1.73m2    Calcium 9.3 8.5 - 10.1 MG/DL    Bilirubin, total 0.3 0.2 - 1.0 MG/DL    ALT (SGPT) 20 13 - 56 U/L    AST (SGOT) 17 10 - 38 U/L    Alk.  phosphatase 141 (H) 45 - 117 U/L    Protein, total 7.5 6.4 - 8.2 g/dL    Albumin 3.8 3.4 - 5.0 g/dL    Globulin 3.7 2.0 - 4.0 g/dL    A-G Ratio 1.0 0.8 - 1.7     LIPASE    Collection Time: 11/18/19 12:11 PM   Result Value Ref Range    Lipase 127 73 - 393 U/L       Radiologic Studies -   CT ABD PELV W CONT   Final Result IMPRESSION:      1. No acute intra-abdominal or pelvic abnormality demonstrated. 2. Normal caliber small and large bowel. No focal bowel wall thickening or   morphology of bowel obstruction. 3. Fibroid uterus. CT Results  (Last 48 hours)               11/18/19 1348  CT ABD PELV W CONT Final result    Impression:  IMPRESSION:       1. No acute intra-abdominal or pelvic abnormality demonstrated. 2. Normal caliber small and large bowel. No focal bowel wall thickening or   morphology of bowel obstruction. 3. Fibroid uterus. Narrative:  EXAM: CT of the abdomen and pelvis       INDICATION: Lower to mid abdominal pain with nausea, vomiting, diarrhea       COMPARISON: Several prior CT exams, most recently June 7, 2019       TECHNIQUE: Axial CT imaging of the abdomen and pelvis was performed with   intravenous contrast. Multiplanar reformats were generated. One or more dose reduction techniques were used on this CT: automated exposure   control, adjustment of the mAs and/or kVp according to patient size, and   iterative reconstruction techniques. The specific techniques used on this CT   exam have been documented in the patient's electronic medical record. Digital   Imaging and Communications in Medicine (DICOM) format image data are available   to nonaffiliated external healthcare facilities or entities on a secure, media   free, reciprocally searchable basis with patient authorization for at least a   12-month period after this study. _______________       FINDINGS:       LOWER CHEST: Minor bibasilar atelectasis without evidence of alveolar   consolidation or pleural effusion. Normal cardiac size without pericardial   effusion. LIVER, BILIARY: Liver is normal. No biliary dilation. Gallbladder is   unremarkable. PANCREAS: Normal.       SPLEEN: Normal.       ADRENALS: Normal.       KIDNEYS/URETERS/BLADDER: Symmetric renal enhancement.  No hydronephrosis or urolithiasis. Urinary bladder normal in CT appearance. PELVIC ORGANS: Lobular uterine contour noted in keeping with underlying   leiomyomata. Physiologic quantity of pelvic fluid. VASCULATURE: Minor aortobiiliac atherosclerotic calcification is present without   evidence of aneurysmal dilatation. Visceral arterial branches are patent. LYMPH NODES: No enlarged lymph nodes. GASTROINTESTINAL TRACT: No focal bowel wall thickening or dilatation. No   evidence of bowel obstruction. Appendix is not discretely visualized; however,   no secondary signs of inflammation are present at the cecal base or in the right   lower quadrant of the abdomen. No free intraperitoneal gas. BONES: No acute or aggressive osseous abnormalities identified. OTHER: Tiny fat-containing umbilical hernia.       _______________               CXR Results  (Last 48 hours)    None          Medical Decision Making   I am the first provider for this patient. I reviewed the vital signs, available nursing notes, past medical history, past surgical history, family history and social history. Vital Signs-Reviewed the patient's vital signs. Patient Vitals for the past 12 hrs:   Temp Pulse Resp BP SpO2   11/18/19 1144 98 °F (36.7 °C) 76 16 145/90 98 %         Records Reviewed: Nursing Notes and Old Medical Records      Provider Notes (Medical Decision Making):   DDx: Gastroenteritis, pancreatitis, colitis, diverticulitis, UTI      ED Course:   Initial assessment performed. The patients presenting problems have been discussed, and they are in agreement with the care plan formulated and outlined with them. I have encouraged them to ask questions as they arise throughout their visit. Disposition:  2:30 PM  Discussed lab and imaging results with pt along with dx and treatment plan. Discussed importance of PCP follow up. All questions answered. Pt voiced they understood. Return if sx worsen. PLAN:  1. Current Discharge Medication List      START taking these medications    Details   hydrocortisone (CORTAID) 1 % topical cream Apply  to affected area two (2) times a day. use thin layer  Qty: 30 g, Refills: 0         CONTINUE these medications which have CHANGED    Details   ondansetron (ZOFRAN ODT) 4 mg disintegrating tablet Take 1 Tab by mouth every eight (8) hours as needed for Nausea. Qty: 10 Tab, Refills: 0           2. Follow-up Information     Follow up With Specialties Details Why [address] [place] [address]  Schedule an appointment as soon as possible for a visit in 1 day  [address] [place] [address] Drive  [phone]        Return to ED if worse     Diagnosis     Clinical Impression:   1. Gastroenteritis        Attestations:    SHAWN Noguera    Please note that this dictation was completed with [employer]uck, the computer voice recognition software. Quite often unanticipated grammatical, syntax, homophones, and other interpretive errors are inadvertently transcribed by the computer software. Please disregard these errors. Please excuse any errors that have escaped final proofreading. Thank you.

## 2020-10-27 ENCOUNTER — HOSPITAL ENCOUNTER (EMERGENCY)
Age: 50
Discharge: HOME | End: 2020-10-27
Attending: EMERGENCY MEDICINE
Payer: COMMERCIAL

## 2020-10-27 VITALS
SYSTOLIC BLOOD PRESSURE: 105 MMHG | TEMPERATURE: 98 F | WEIGHT: 180 LBS | RESPIRATION RATE: 15 BRPM | DIASTOLIC BLOOD PRESSURE: 63 MMHG | OXYGEN SATURATION: 100 % | HEART RATE: 81 BPM | BODY MASS INDEX: 31.89 KG/M2 | HEIGHT: 63 IN

## 2020-10-27 DIAGNOSIS — R19.7 DIARRHEA, UNSPECIFIED TYPE: Primary | ICD-10-CM

## 2020-10-27 DIAGNOSIS — S01.502A: ICD-10-CM

## 2020-10-27 DIAGNOSIS — R10.84 ABDOMINAL PAIN, GENERALIZED: ICD-10-CM

## 2020-10-27 LAB
ALBUMIN SERPL-MCNC: 3.8 G/DL (ref 3.4–5)
ALBUMIN/GLOB SERPL: 1 {RATIO} (ref 0.8–1.7)
ALP SERPL-CCNC: 152 U/L (ref 45–117)
ALT SERPL-CCNC: 20 U/L (ref 13–56)
ANION GAP SERPL CALC-SCNC: 5 MMOL/L (ref 3–18)
AST SERPL-CCNC: 18 U/L (ref 10–38)
BASOPHILS # BLD: 0 K/UL (ref 0–0.1)
BASOPHILS NFR BLD: 0 % (ref 0–2)
BILIRUB DIRECT SERPL-MCNC: <0.1 MG/DL (ref 0–0.2)
BILIRUB SERPL-MCNC: 0.2 MG/DL (ref 0.2–1)
BUN SERPL-MCNC: 9 MG/DL (ref 7–18)
BUN/CREAT SERPL: 11 (ref 12–20)
CALCIUM SERPL-MCNC: 9.3 MG/DL (ref 8.5–10.1)
CHLORIDE SERPL-SCNC: 107 MMOL/L (ref 100–111)
CO2 SERPL-SCNC: 27 MMOL/L (ref 21–32)
CREAT SERPL-MCNC: 0.84 MG/DL (ref 0.6–1.3)
DIFFERENTIAL METHOD BLD: ABNORMAL
EOSINOPHIL # BLD: 0 K/UL (ref 0–0.4)
EOSINOPHIL NFR BLD: 0 % (ref 0–5)
ERYTHROCYTE [DISTWIDTH] IN BLOOD BY AUTOMATED COUNT: 12.3 % (ref 11.6–14.5)
GLOBULIN SER CALC-MCNC: 3.8 G/DL (ref 2–4)
GLUCOSE SERPL-MCNC: 90 MG/DL (ref 74–99)
HCT VFR BLD AUTO: 42 % (ref 35–45)
HGB BLD-MCNC: 14.3 G/DL (ref 12–16)
LIPASE SERPL-CCNC: 133 U/L (ref 73–393)
LYMPHOCYTES # BLD: 2 K/UL (ref 0.9–3.6)
LYMPHOCYTES NFR BLD: 17 % (ref 21–52)
MCH RBC QN AUTO: 29.7 PG (ref 24–34)
MCHC RBC AUTO-ENTMCNC: 34 G/DL (ref 31–37)
MCV RBC AUTO: 87.1 FL (ref 74–97)
MONOCYTES # BLD: 0.5 K/UL (ref 0.05–1.2)
MONOCYTES NFR BLD: 4 % (ref 3–10)
NEUTS SEG # BLD: 9 K/UL (ref 1.8–8)
NEUTS SEG NFR BLD: 79 % (ref 40–73)
PLATELET # BLD AUTO: 315 K/UL (ref 135–420)
PMV BLD AUTO: 9.6 FL (ref 9.2–11.8)
POTASSIUM SERPL-SCNC: 3.8 MMOL/L (ref 3.5–5.5)
PROT SERPL-MCNC: 7.6 G/DL (ref 6.4–8.2)
RBC # BLD AUTO: 4.82 M/UL (ref 4.2–5.3)
SODIUM SERPL-SCNC: 139 MMOL/L (ref 136–145)
WBC # BLD AUTO: 11.6 K/UL (ref 4.6–13.2)

## 2020-10-27 PROCEDURE — 74011250636 HC RX REV CODE- 250/636: Performed by: EMERGENCY MEDICINE

## 2020-10-27 PROCEDURE — 96374 THER/PROPH/DIAG INJ IV PUSH: CPT

## 2020-10-27 PROCEDURE — 83690 ASSAY OF LIPASE: CPT

## 2020-10-27 PROCEDURE — 96375 TX/PRO/DX INJ NEW DRUG ADDON: CPT

## 2020-10-27 PROCEDURE — 80048 BASIC METABOLIC PNL TOTAL CA: CPT

## 2020-10-27 PROCEDURE — 80076 HEPATIC FUNCTION PANEL: CPT

## 2020-10-27 PROCEDURE — 96361 HYDRATE IV INFUSION ADD-ON: CPT

## 2020-10-27 PROCEDURE — 99283 EMERGENCY DEPT VISIT LOW MDM: CPT

## 2020-10-27 PROCEDURE — 85025 COMPLETE CBC W/AUTO DIFF WBC: CPT

## 2020-10-27 RX ORDER — MORPHINE SULFATE 4 MG/ML
4 INJECTION, SOLUTION INTRAMUSCULAR; INTRAVENOUS
Status: COMPLETED | OUTPATIENT
Start: 2020-10-27 | End: 2020-10-27

## 2020-10-27 RX ORDER — ONDANSETRON 2 MG/ML
4 INJECTION INTRAMUSCULAR; INTRAVENOUS
Status: COMPLETED | OUTPATIENT
Start: 2020-10-27 | End: 2020-10-27

## 2020-10-27 RX ORDER — ONDANSETRON 4 MG/1
8 TABLET, FILM COATED ORAL
Qty: 12 TAB | Refills: 0 | Status: SHIPPED | OUTPATIENT
Start: 2020-10-27

## 2020-10-27 RX ORDER — GABAPENTIN 100 MG/1
CAPSULE ORAL 3 TIMES DAILY
COMMUNITY

## 2020-10-27 RX ADMIN — SODIUM CHLORIDE 1000 ML: 900 INJECTION, SOLUTION INTRAVENOUS at 10:40

## 2020-10-27 RX ADMIN — ONDANSETRON 4 MG: 2 INJECTION INTRAMUSCULAR; INTRAVENOUS at 10:42

## 2020-10-27 RX ADMIN — MORPHINE SULFATE 4 MG: 4 INJECTION, SOLUTION INTRAMUSCULAR; INTRAVENOUS at 10:41

## 2020-10-27 NOTE — ED NOTES
Pt has blisters and raw skin on roof of mouth. No swelling or reddness noted on tonsils or inside of mouth. Uvula is midline .

## 2020-10-27 NOTE — ED TRIAGE NOTES
Pt with dentures, noted 2 blisters , now since Friday roof of mouth sore and excoriated. Also has abdomional pain and diarrhea.

## 2020-10-27 NOTE — LETTER
NOTIFICATION RETURN TO WORK / SCHOOL 
 
10/27/2020 12:14 PM 
 
Ms. Bailey Carlos [facility] [phone] [id number] To Whom It May Concern: 
 
Ant Chaudhry is currently under the care of [facility] EMERGENCY DEPT. She will return to work/school on: 10/28/2020 If there are questions or concerns please have the patient contact our office. Sincerely, Clementine Garcia RN

## 2020-10-27 NOTE — ED NOTES
Helped pt to the restroom via a wheelchair. Pt was placed back in bed with bed lowered and call bell within reach.

## 2020-10-27 NOTE — ED NOTES
I have reviewed discharge instruction and prescriptions with pt. Pt verbalized understanding and has no further questions at this time. Education taught and patient verbalized understanding of education. Teach back method used. 20G IV removed, catheter tip intact on removal.  Armband removed and shredded per patients request.    Patients pain 0/10. Belongings are with pt. Patient discharged with self to home.

## 2020-10-27 NOTE — ED PROVIDER NOTES
[facility] EMERGENCY DEPT      9:45 AM    Date: 10/27/2020  Patient Name: Caro Santos    History of Presenting Illness     Chief Complaint   Patient presents with    Dental Pain    Abdominal Pain    Diarrhea       51 y.o. female with noted past medical history who presents to the emergency department with abdominal pain, diarrhea and nausea. Patient also complained of oral lesion. Patient states that 4 days ago started noticing what she thought was some felt like blisters on the top of her mouth. Specifically on the hard palate. She states that she wears dentures and has continued to wear them despite these lesions. They have gotten worse to the present and are not healing. She has not tried anything to symptomatically treat them. About the same time she started developed some diffuse migratory abdominal pain that is been intermittent over the last 4 days to the present. She had intermittent associated nausea but no vomiting. This morning at 4 AM she woke up and start having some watery diarrhea and thus came to the ER for evaluation and treatment. She has not tried any over-the-counter medication for her diarrhea yet. Patient denies any other associated signs or symptoms. Patient denies any other complaints. Nursing notes regarding the HPI and triage nursing notes were reviewed. Prior medical records were reviewed. Current Outpatient Medications   Medication Sig Dispense Refill    gabapentin (NEURONTIN) 100 mg capsule Take  by mouth three (3) times daily.  aspirin-acetaminophen-caffeine (EXCEDRIN ES) 250-250-65 mg per tablet Take 1 Tab by mouth. Past History     Past Medical History:  Past Medical History:   Diagnosis Date    Diverticulitis     Hypertension     Murmur, cardiac        Past Surgical History:  Past Surgical History:   Procedure Laterality Date    HX BREAST BIOPSY      biopsy       Family History:  History reviewed.  No pertinent family history. Social History:  Social History     Tobacco Use    Smoking status: Current Every Day Smoker     Packs/day: 0.50    Smokeless tobacco: Never Used   Substance Use Topics    Alcohol use: Yes     Comment: rarely    Drug use: No       Allergies: Allergies   Allergen Reactions    Dextromethorphan Rash    Penicillins Rash       Patient's primary care provider (as noted in EPIC):  None    Review of Systems   Constitutional: Negative for diaphoresis. HENT: Negative for congestion. Eyes: Negative for discharge. Respiratory: Negative for stridor. Cardiovascular: Negative for palpitations. Gastrointestinal: Negative for diarrhea. Endocrine: Negative for heat intolerance. Genitourinary: Negative for flank pain. Musculoskeletal: Negative for back pain. Neurological: Negative for weakness. Psychiatric/Behavioral: Negative for hallucinations. All other systems reviewed and are negative. Visit Vitals  /78   Pulse 81   Temp 98 °F (36.7 °C)   Resp 15   Ht 5' 3\" (1.6 m)   Wt 81.6 kg (180 lb)   SpO2 100%   BMI 31.89 kg/m²       PHYSICAL EXAM:    CONSTITUTIONAL:  Alert, in no apparent distress;  well developed;  well nourished. HEAD:  Normocephalic, atraumatic. EYES:  EOMI. Non-icteric sclera. Normal conjunctiva. ENTM:  Nose:  no rhinorrhea. Throat:  no erythema or exudate, mucous membranes moist.  There is an area on the right side of the hard palate that is denuded and appears as if it is an erosion. This may be secondary to her dentures that she wears. No blisters. No discharge. NECK:  No JVD. Supple  RESPIRATORY:  Chest clear, equal breath sounds, good air movement. CARDIOVASCULAR:  Regular rate and rhythm. No murmurs, rubs, or gallops. GI:  Normal bowel sounds, abdomen soft and non-tender. No rebound or guarding. No palpable masses. BACK:  Non-tender. UPPER EXT:  Normal inspection. LOWER EXT:  No edema, no calf tenderness. Distal pulses intact.   NEURO: Moves all four extremities, and grossly normal motor exam.  SKIN:  No rashes;  Normal for age. PSYCH:  Alert and normal affect. DIFFERENTIAL DIAGNOSES/ MEDICAL DECISION MAKING:  Viral diarrhea, food poisoning, bacterial diarrhea. Lower on differential diagnosis in this patient is Clostridium difficile related diarrhea, irritable bowel syndrome, crohn's disease, or ulcerative colitis. Diagnostic Study Results     Abnormal lab results from this emergency department encounter:  Labs Reviewed   CBC WITH AUTOMATED DIFF - Abnormal; Notable for the following components:       Result Value    NEUTROPHILS 79 (*)     LYMPHOCYTES 17 (*)     ABS. NEUTROPHILS 9.0 (*)     All other components within normal limits   METABOLIC PANEL, BASIC - Abnormal; Notable for the following components:    BUN/Creatinine ratio 11 (*)     All other components within normal limits   HEPATIC FUNCTION PANEL - Abnormal; Notable for the following components:    Alk. phosphatase 152 (*)     All other components within normal limits   LIPASE       Lab values for this patient within approximately the last 12 hours:  Recent Results (from the past 12 hour(s))   CBC WITH AUTOMATED DIFF    Collection Time: 10/27/20 10:40 AM   Result Value Ref Range    WBC 11.6 4.6 - 13.2 K/uL    RBC 4.82 4.20 - 5.30 M/uL    HGB 14.3 12.0 - 16.0 g/dL    HCT 42.0 35.0 - 45.0 %    MCV 87.1 74.0 - 97.0 FL    MCH 29.7 24.0 - 34.0 PG    MCHC 34.0 31.0 - 37.0 g/dL    RDW 12.3 11.6 - 14.5 %    PLATELET 802 547 - 332 K/uL    MPV 9.6 9.2 - 11.8 FL    NEUTROPHILS 79 (H) 40 - 73 %    LYMPHOCYTES 17 (L) 21 - 52 %    MONOCYTES 4 3 - 10 %    EOSINOPHILS 0 0 - 5 %    BASOPHILS 0 0 - 2 %    ABS. NEUTROPHILS 9.0 (H) 1.8 - 8.0 K/UL    ABS. LYMPHOCYTES 2.0 0.9 - 3.6 K/UL    ABS. MONOCYTES 0.5 0.05 - 1.2 K/UL    ABS. EOSINOPHILS 0.0 0.0 - 0.4 K/UL    ABS.  BASOPHILS 0.0 0.0 - 0.1 K/UL    DF AUTOMATED     METABOLIC PANEL, BASIC    Collection Time: 10/27/20 10:40 AM   Result Value Ref Range Sodium 139 136 - 145 mmol/L    Potassium 3.8 3.5 - 5.5 mmol/L    Chloride 107 100 - 111 mmol/L    CO2 27 21 - 32 mmol/L    Anion gap 5 3.0 - 18 mmol/L    Glucose 90 74 - 99 mg/dL    BUN 9 7.0 - 18 MG/DL    Creatinine 0.84 0.6 - 1.3 MG/DL    BUN/Creatinine ratio 11 (L) 12 - 20      GFR est AA >60 >60 ml/min/1.73m2    GFR est non-AA >60 >60 ml/min/1.73m2    Calcium 9.3 8.5 - 10.1 MG/DL   LIPASE    Collection Time: 10/27/20 10:40 AM   Result Value Ref Range    Lipase 133 73 - 393 U/L   HEPATIC FUNCTION PANEL    Collection Time: 10/27/20 10:40 AM   Result Value Ref Range    Protein, total 7.6 6.4 - 8.2 g/dL    Albumin 3.8 3.4 - 5.0 g/dL    Globulin 3.8 2.0 - 4.0 g/dL    A-G Ratio 1.0 0.8 - 1.7      Bilirubin, total 0.2 0.2 - 1.0 MG/DL    Bilirubin, direct <0.1 0.0 - 0.2 MG/DL    Alk. phosphatase 152 (H) 45 - 117 U/L    AST (SGOT) 18 10 - 38 U/L    ALT (SGPT) 20 13 - 56 U/L       Radiologist and cardiologist interpretations if available at time of this note:  No results found. Medication(s) ordered for patient during this emergency visit encounter:  Medications   sodium chloride 0.9 % bolus infusion 1,000 mL (1,000 mL IntraVENous New Bag 10/27/20 1040)   morphine injection 4 mg (4 mg IntraVENous Given 10/27/20 1041)   ondansetron (ZOFRAN) injection 4 mg (4 mg IntraVENous Given 10/27/20 1042)       Medical Decision Making     I am the first provider for this patient. I reviewed the vital signs, available nursing notes, past medical history, past surgical history, family history and social history. Vital Signs:  Reviewed the patient's vital signs. ED COURSE:      IMPRESSION AND MEDICAL DECISION MAKING:  Based upon the patient's presentation with noted HPI and PE, along with the work up done in the emergency department, I believe that the patient is having diarrhea and abdominal pain, most likely viral etiology. I do believe though that the patient is well enough for discharge home.         SPECIFIC PATIENT INSTRUCTIONS FROM THE PHYSICIAN WHO TREATED YOU IN THE ER TODAY:  1. Use over the counter imodium as directed on the packaging for diarrhea. 2. Over the counter imodium for diarrhea. 3. FOLLOW UP APPOINTMENT:  Your primary doctor in 3-4 days for reevaluation. Patient is improved, resting quietly and comfortably. The patient will be discharged home. The patient was reassured that these symptoms do not appear to represent a serious or life threatening condition at this time. Warning signs of worsening condition were discussed and understood by the patient. Based on patient's age, coexisting illness, exam, and the results of this ED evaluation, the decision to treat as an outpatient was made. Based on the information available at time of discharge, acute pathology requiring immediate intervention was deemed relative unlikely. While it is impossible to completely exclude the possibility of underlying serious disease or worsening of condition, I feel the relative likelihood is extremely low. I discussed this uncertainty with the patient, who understood ED evaluation and treatment and felt comfortable with the outpatient treatment plan. All questions regarding care, test results, and follow up were answered. The patient is stable and appropriate to discharge. They understand that they should return to the emergency department for any new or worsening symptoms. I stressed the importance of follow up for repeat assessment and possibly further evaluation/treatment. Dictation disclaimer:  Please note that this dictation was completed with [employer], the computer voice recognition software. Quite often unanticipated grammatical, syntax, homophones, and other interpretive errors are inadvertently transcribed by the computer software. Please disregard these errors. Please excuse any errors that have escaped final proofreading. Coding Diagnoses     Clinical Impression:   1.  Diarrhea, unspecified type    2. Abdominal pain, generalized    3. Open wound of upper hard palate, initial encounter        Disposition     Disposition: Discharge. Royden Simmonds L. Kristene Gilles, M.D. JASBIR Board Certified Emergency Physician    Provider Attestation:  If a scribe was utilized in generation of this patient record, I personally performed the services described in the documentation, reviewed the documentation, as recorded by the scribe in my presence, and it accurately records the patient's history of presenting illness, review of systems, patient physical examination, and procedures performed by me as the attending physician. Royden Simmonds L. Kristene Gilles, M.D.   JASBIR Board Certified Emergency Physician  10/27/2020.  9:47 AM

## 2021-01-05 ENCOUNTER — TRANSCRIBE ORDER (OUTPATIENT)
Dept: REGISTRATION | Age: 51
End: 2021-01-05

## 2021-01-05 ENCOUNTER — HOSPITAL ENCOUNTER (OUTPATIENT)
Dept: GENERAL RADIOLOGY | Age: 51
Discharge: HOME | End: 2021-01-05
Payer: COMMERCIAL

## 2021-01-05 DIAGNOSIS — M25.512 LEFT SHOULDER PAIN: ICD-10-CM

## 2021-01-05 DIAGNOSIS — M25.512 LEFT SHOULDER PAIN: Primary | ICD-10-CM

## 2021-01-05 PROCEDURE — 73030 X-RAY EXAM OF SHOULDER: CPT

## 2021-01-06 ENCOUNTER — TRANSCRIBE ORDER (OUTPATIENT)
Dept: SCHEDULING | Age: 51
End: 2021-01-06

## 2021-01-06 DIAGNOSIS — Z12.31 VISIT FOR SCREENING MAMMOGRAM: Primary | ICD-10-CM

## 2021-01-21 ENCOUNTER — HOSPITAL ENCOUNTER (EMERGENCY)
Age: 51
Discharge: HOME | End: 2021-01-21
Attending: STUDENT IN AN ORGANIZED HEALTH CARE EDUCATION/TRAINING PROGRAM
Payer: COMMERCIAL

## 2021-01-21 ENCOUNTER — APPOINTMENT (OUTPATIENT)
Dept: GENERAL RADIOLOGY | Age: 51
End: 2021-01-21
Attending: PHYSICIAN ASSISTANT
Payer: COMMERCIAL

## 2021-01-21 VITALS
TEMPERATURE: 97.6 F | WEIGHT: 185 LBS | DIASTOLIC BLOOD PRESSURE: 93 MMHG | RESPIRATION RATE: 18 BRPM | SYSTOLIC BLOOD PRESSURE: 145 MMHG | OXYGEN SATURATION: 100 % | BODY MASS INDEX: 32.78 KG/M2 | HEIGHT: 63 IN | HEART RATE: 81 BPM

## 2021-01-21 DIAGNOSIS — R07.89 MUSCULOSKELETAL CHEST PAIN: Primary | ICD-10-CM

## 2021-01-21 LAB
ALBUMIN SERPL-MCNC: 3.9 G/DL (ref 3.4–5)
ALBUMIN/GLOB SERPL: 1 {RATIO} (ref 0.8–1.7)
ALP SERPL-CCNC: 146 U/L (ref 45–117)
ALT SERPL-CCNC: 37 U/L (ref 13–56)
ANION GAP SERPL CALC-SCNC: 4 MMOL/L (ref 3–18)
AST SERPL-CCNC: 18 U/L (ref 10–38)
BASOPHILS # BLD: 0 K/UL (ref 0–0.1)
BASOPHILS NFR BLD: 1 % (ref 0–2)
BILIRUB SERPL-MCNC: 0.3 MG/DL (ref 0.2–1)
BUN SERPL-MCNC: 9 MG/DL (ref 7–18)
BUN/CREAT SERPL: 10 (ref 12–20)
CALCIUM SERPL-MCNC: 9.7 MG/DL (ref 8.5–10.1)
CHLORIDE SERPL-SCNC: 107 MMOL/L (ref 100–111)
CO2 SERPL-SCNC: 29 MMOL/L (ref 21–32)
CREAT SERPL-MCNC: 0.87 MG/DL (ref 0.6–1.3)
DIFFERENTIAL METHOD BLD: ABNORMAL
EOSINOPHIL # BLD: 0.1 K/UL (ref 0–0.4)
EOSINOPHIL NFR BLD: 2 % (ref 0–5)
ERYTHROCYTE [DISTWIDTH] IN BLOOD BY AUTOMATED COUNT: 12.7 % (ref 11.6–14.5)
GLOBULIN SER CALC-MCNC: 3.8 G/DL (ref 2–4)
GLUCOSE SERPL-MCNC: 100 MG/DL (ref 74–99)
HCT VFR BLD AUTO: 43.6 % (ref 35–45)
HGB BLD-MCNC: 14.5 G/DL (ref 12–16)
LIPASE SERPL-CCNC: 180 U/L (ref 73–393)
LYMPHOCYTES # BLD: 4.1 K/UL (ref 0.9–3.6)
LYMPHOCYTES NFR BLD: 51 % (ref 21–52)
MCH RBC QN AUTO: 29.4 PG (ref 24–34)
MCHC RBC AUTO-ENTMCNC: 33.3 G/DL (ref 31–37)
MCV RBC AUTO: 88.4 FL (ref 74–97)
MONOCYTES # BLD: 0.5 K/UL (ref 0.05–1.2)
MONOCYTES NFR BLD: 6 % (ref 3–10)
NEUTS SEG # BLD: 3.2 K/UL (ref 1.8–8)
NEUTS SEG NFR BLD: 40 % (ref 40–73)
PLATELET # BLD AUTO: 307 K/UL (ref 135–420)
PMV BLD AUTO: 9.6 FL (ref 9.2–11.8)
POTASSIUM SERPL-SCNC: 4.5 MMOL/L (ref 3.5–5.5)
PROT SERPL-MCNC: 7.7 G/DL (ref 6.4–8.2)
RBC # BLD AUTO: 4.93 M/UL (ref 4.2–5.3)
SODIUM SERPL-SCNC: 140 MMOL/L (ref 136–145)
TROPONIN I SERPL-MCNC: <0.02 NG/ML (ref 0–0.04)
WBC # BLD AUTO: 7.9 K/UL (ref 4.6–13.2)

## 2021-01-21 PROCEDURE — 93005 ELECTROCARDIOGRAM TRACING: CPT

## 2021-01-21 PROCEDURE — 71046 X-RAY EXAM CHEST 2 VIEWS: CPT

## 2021-01-21 PROCEDURE — 99283 EMERGENCY DEPT VISIT LOW MDM: CPT

## 2021-01-21 PROCEDURE — 85025 COMPLETE CBC W/AUTO DIFF WBC: CPT

## 2021-01-21 PROCEDURE — 80053 COMPREHEN METABOLIC PANEL: CPT

## 2021-01-21 PROCEDURE — 84484 ASSAY OF TROPONIN QUANT: CPT

## 2021-01-21 PROCEDURE — 83690 ASSAY OF LIPASE: CPT

## 2021-01-21 NOTE — ED PROVIDER NOTES
52-year-old female with past medical history significant for hypertension and diverticulitis presents to the ED with complaints of 4 days of left-sided chest pain that she describes as a sharp discomfort. It is constant in nature and worse when lying down. She took a baby aspirin and Excedrin this morning without improvement in symptoms. She also endorses intermittent chills but denies any coughing, shortness of breath, nausea, vomiting, recent travel or exposure to anyone with known Covid. She smokes cigarettes but does not drink alcohol or use recreational drugs. No other complaints. Past Medical History:   Diagnosis Date    Diverticulitis     Hypertension     Murmur, cardiac        Past Surgical History:   Procedure Laterality Date    HX BREAST BIOPSY      biopsy         History reviewed. No pertinent family history.     Social History     Socioeconomic History    Marital status: SINGLE     Spouse name: Not on file    Number of children: Not on file    Years of education: Not on file    Highest education level: Not on file   Occupational History    Not on file   Social Needs    Financial resource strain: Not on file    Food insecurity     Worry: Not on file     Inability: Not on file    Transportation needs     Medical: Not on file     Non-medical: Not on file   Tobacco Use    Smoking status: Current Every Day Smoker     Packs/day: 0.50    Smokeless tobacco: Never Used   Substance and Sexual Activity    Alcohol use: Yes     Comment: rarely    Drug use: No    Sexual activity: Yes     Birth control/protection: None   Lifestyle    Physical activity     Days per week: Not on file     Minutes per session: Not on file    Stress: Not on file   Relationships    Social connections     Talks on phone: Not on file     Gets together: Not on file     Attends Church service: Not on file     Active member of club or organization: Not on file     Attends meetings of clubs or organizations: Not on file     Relationship status: Not on file    Intimate partner violence     Fear of current or ex partner: Not on file     Emotionally abused: Not on file     Physically abused: Not on file     Forced sexual activity: Not on file   Other Topics Concern    Not on file   Social History Narrative    Not on file         ALLERGIES: Dextromethorphan and Penicillins    Review of Systems   Constitutional: Positive for chills. Negative for activity change and appetite change. HENT: Negative for drooling and facial swelling. Eyes: Negative for pain and discharge. Respiratory: Negative for apnea and choking. Cardiovascular: Positive for chest pain. Negative for palpitations and leg swelling. Gastrointestinal: Negative for blood in stool and rectal pain. Endocrine: Negative for polydipsia and polyphagia. Genitourinary: Negative for genital sores and hematuria. Musculoskeletal: Negative for gait problem and neck stiffness. Skin: Negative for color change and rash. Allergic/Immunologic: Negative for environmental allergies. Neurological: Negative for tremors. Hematological: Negative for adenopathy. Psychiatric/Behavioral: Negative for agitation and behavioral problems. Vitals:    01/21/21 1632   BP: (!) 145/93   Pulse: 81   Resp: 18   Temp: 97.6 °F (36.4 °C)   SpO2: 100%   Weight: 83.9 kg (185 lb)   Height: 5' 3\" (1.6 m)            Physical Exam  Vitals signs and nursing note reviewed. Constitutional:       Appearance: Normal appearance. HENT:      Head: Normocephalic and atraumatic. Eyes:      Extraocular Movements: Extraocular movements intact. Pupils: Pupils are equal, round, and reactive to light. Neck:      Musculoskeletal: Normal range of motion. Cardiovascular:      Rate and Rhythm: Normal rate and regular rhythm. Heart sounds: Normal heart sounds. No murmur. No friction rub.    Pulmonary:      Effort: Pulmonary effort is normal.      Breath sounds: Normal breath sounds. Abdominal:      Palpations: Abdomen is soft. Musculoskeletal: Normal range of motion. Skin:     General: Skin is warm and dry. Capillary Refill: Capillary refill takes less than 2 seconds. Neurological:      General: No focal deficit present. Mental Status: She is alert and oriented to person, place, and time. Psychiatric:         Mood and Affect: Mood normal.          MDM  Number of Diagnoses or Management Options  Musculoskeletal chest pain  Diagnosis management comments: 76-year-old female with history of hypertension here with chest pain. Will perform ACS work-up. Patient is refusing any pain medication at this time. Will reassess. EKG is normal sinus rhythm at a rate of 72 with normal intervals, no ST elevations or depressions, no T wave inversions as interpreted by me. Laboratory work-up unremarkable. Troponin negative. Chest x-ray negative for any acute pathology as interpreted by me. HEART score 2. Patient is stable for discharge home. Pt has been reexamined. Patient has no new complaints, changes, or physical findings. Care plan outlined and precautions discussed. Results were reviewed with the patient. All medications were reviewed with the patient; will d/c home with PMD f/u. All of pt's questions and concerns were addressed. Patient was instructed and agrees to follow up with PMD, as well as to return to the ED upon further deterioration. Patient is ready to go home. This note was dictated utilizing voice recognition software which may lead to typographical errors.  I apologize in advance if the situation occurs.  If questions arise please do not hesitate to contact me or call our department.     Christiane Fuller, DO               Procedures

## 2021-01-21 NOTE — DEATH NOTE
I performed a brief history of the patient here in triage and I have determined that pt will need further treatment and evaluation from the main side ER physician or DANIEL. I have placed initial orders based on the history to help in expediting patients care.    
  
SHAWN Reed  4:32 PM

## 2021-01-21 NOTE — ED TRIAGE NOTES
Pt presents ambulatory to ED via triage from home with complaints of epigastric and left sided chest pain that's worse when leaning over and lying flat x 3 days.        Past Medical History:   Diagnosis Date    Diverticulitis     Hypertension     Murmur, cardiac

## 2021-01-22 LAB
ATRIAL RATE: 72 BPM
CALCULATED P AXIS, ECG09: 64 DEGREES
CALCULATED R AXIS, ECG10: 43 DEGREES
CALCULATED T AXIS, ECG11: 51 DEGREES
DIAGNOSIS, 93000: NORMAL
P-R INTERVAL, ECG05: 186 MS
Q-T INTERVAL, ECG07: 376 MS
QRS DURATION, ECG06: 94 MS
QTC CALCULATION (BEZET), ECG08: 411 MS
VENTRICULAR RATE, ECG03: 72 BPM

## 2021-03-16 ENCOUNTER — HOSPITAL ENCOUNTER (OUTPATIENT)
Dept: MAMMOGRAPHY | Age: 51
Discharge: HOME | End: 2021-03-16
Attending: OBSTETRICS & GYNECOLOGY
Payer: COMMERCIAL

## 2021-03-16 ENCOUNTER — HOSPITAL ENCOUNTER (OUTPATIENT)
Dept: ULTRASOUND IMAGING | Age: 51
Discharge: HOME | End: 2021-03-16
Attending: OBSTETRICS & GYNECOLOGY
Payer: COMMERCIAL

## 2021-03-16 DIAGNOSIS — N64.4 BREAST PAIN, RIGHT: ICD-10-CM

## 2021-03-16 DIAGNOSIS — N64.4 BREAST PAIN, LEFT: ICD-10-CM

## 2021-03-16 PROCEDURE — 77062 BREAST TOMOSYNTHESIS BI: CPT

## 2021-03-16 PROCEDURE — 76642 ULTRASOUND BREAST LIMITED: CPT

## 2023-03-20 ENCOUNTER — HOSPITAL ENCOUNTER (EMERGENCY)
Age: 53
Discharge: HOME | End: 2023-03-20
Attending: STUDENT IN AN ORGANIZED HEALTH CARE EDUCATION/TRAINING PROGRAM
Payer: MEDICAID

## 2023-03-20 ENCOUNTER — APPOINTMENT (OUTPATIENT)
Dept: GENERAL RADIOLOGY | Age: 53
End: 2023-03-20
Attending: STUDENT IN AN ORGANIZED HEALTH CARE EDUCATION/TRAINING PROGRAM
Payer: MEDICAID

## 2023-03-20 VITALS
OXYGEN SATURATION: 99 % | DIASTOLIC BLOOD PRESSURE: 80 MMHG | SYSTOLIC BLOOD PRESSURE: 121 MMHG | HEIGHT: 63 IN | TEMPERATURE: 97.9 F | HEART RATE: 63 BPM | BODY MASS INDEX: 34.55 KG/M2 | WEIGHT: 195 LBS | RESPIRATION RATE: 14 BRPM

## 2023-03-20 DIAGNOSIS — M25.572 ACUTE LEFT ANKLE PAIN: Primary | ICD-10-CM

## 2023-03-20 PROCEDURE — 73610 X-RAY EXAM OF ANKLE: CPT

## 2023-03-20 PROCEDURE — 74011250637 HC RX REV CODE- 250/637: Performed by: STUDENT IN AN ORGANIZED HEALTH CARE EDUCATION/TRAINING PROGRAM

## 2023-03-20 PROCEDURE — 96372 THER/PROPH/DIAG INJ SC/IM: CPT

## 2023-03-20 PROCEDURE — 74011250636 HC RX REV CODE- 250/636: Performed by: STUDENT IN AN ORGANIZED HEALTH CARE EDUCATION/TRAINING PROGRAM

## 2023-03-20 PROCEDURE — 99284 EMERGENCY DEPT VISIT MOD MDM: CPT

## 2023-03-20 RX ORDER — ACETAMINOPHEN 325 MG/1
650 TABLET ORAL
Qty: 40 TABLET | Refills: 0 | Status: SHIPPED | OUTPATIENT
Start: 2023-03-20

## 2023-03-20 RX ORDER — KETOROLAC TROMETHAMINE 30 MG/ML
15 INJECTION, SOLUTION INTRAMUSCULAR; INTRAVENOUS
Status: COMPLETED | OUTPATIENT
Start: 2023-03-20 | End: 2023-03-20

## 2023-03-20 RX ORDER — ACETAMINOPHEN 500 MG
1000 TABLET ORAL
Status: COMPLETED | OUTPATIENT
Start: 2023-03-20 | End: 2023-03-20

## 2023-03-20 RX ORDER — IBUPROFEN 400 MG/1
400 TABLET ORAL
Qty: 20 TABLET | Refills: 0 | Status: SHIPPED | OUTPATIENT
Start: 2023-03-20

## 2023-03-20 RX ADMIN — ACETAMINOPHEN 1000 MG: 500 TABLET ORAL at 22:35

## 2023-03-20 RX ADMIN — KETOROLAC TROMETHAMINE 15 MG: 30 INJECTION, SOLUTION INTRAMUSCULAR; INTRAVENOUS at 22:35

## 2023-03-21 NOTE — ED PROVIDER NOTES
[id number]  EMERGENCY DEPARTMENT ENCOUNTER NOTE        Date: 3/20/2023  Patient Name: Sophie Logan      History of Presenting Illness     Chief Complaint   Patient presents with    Ankle Pain     Left ankle pain       History Provided By: Patient    HPI: Sophie Logan, 47 y.o. female with PMH and medication as below comes to the ED with ankle pain that started several days ago, worsened by ambulation, she does not recall any trauma. Pain is sharp and worsened with movement of the ankle as well, additionally relieving factors without associated falls, change in color, or swelling patient had multiple similar episodes in the past.  She took gabapentin and ibuprofen approximately in the afternoon and that has given her some relief. Denies any prior history of gout. No fever, chills or sweats. She otherwise reports that she is been healthy and there is no change in her bowel, dater, urinary habits. She does not have any other concerns that she has today. PCP: Candice Kam PA-C    Current Outpatient Medications   Medication Sig Dispense Refill    ibuprofen (MOTRIN) 400 mg tablet Take 1 Tablet by mouth every six (6) hours as needed for Pain. 20 Tablet 0    acetaminophen (TYLENOL) 325 mg tablet Take 2 Tablets by mouth every six (6) hours as needed for Pain. 40 Tablet 0    gabapentin (NEURONTIN) 100 mg capsule Take  by mouth three (3) times daily. aspirin-acetaminophen-caffeine (EXCEDRIN ES) 250-250-65 mg per tablet Take 1 Tab by mouth. ondansetron hcl (Zofran) 4 mg tablet Take 2 Tabs by mouth every eight (8) hours as needed for Nausea. 12 Tab 0       Past History     Past Medical History:  Past Medical History:   Diagnosis Date    Diverticulitis     Hypertension     Menopause     Murmur, cardiac        Past Surgical History:  Past Surgical History:   Procedure Laterality Date    HX BREAST BIOPSY Left     biopsy, pt states ? 2005 and it was benign    HX GYN         Family History:  History reviewed. No pertinent family history. Social History:  Social History     Tobacco Use    Smoking status: Every Day     Packs/day: 0.50     Types: Cigarettes    Smokeless tobacco: Never   Substance Use Topics    Alcohol use: Not Currently     Comment: rarely    Drug use: No       Allergies: Allergies   Allergen Reactions    Dextromethorphan Rash    Penicillins Rash         Review of Systems     Review of Systems    A 10 point review of system was performed and was negative except as noted above in HPI    Physical Exam     Physical Exam  Vitals and nursing note reviewed. Constitutional:       General: She is not in acute distress. Appearance: She is obese. She is not ill-appearing or toxic-appearing. HENT:      Head: Normocephalic and atraumatic. Eyes:      Extraocular Movements: Extraocular movements intact. Conjunctiva/sclera: Conjunctivae normal.   Cardiovascular:      Rate and Rhythm: Normal rate and regular rhythm. Pulmonary:      Effort: Pulmonary effort is normal. No respiratory distress. Musculoskeletal:      Left lower leg: Normal.      Left ankle: No swelling, deformity, ecchymosis or lacerations. Tenderness present. Normal range of motion. Normal pulse. Left foot: Normal.   Neurological:      Mental Status: She is alert. Sensory: Sensation is intact. Motor: Motor function is intact. Lab and Diagnostic Study Results     Labs -   No results found for this or any previous visit (from the past 12 hour(s)). Radiologic Studies -   [unfilled]  CT Results  (Last 48 hours)      None          CXR Results  (Last 48 hours)      None            Medical Decision Making and ED Course   - I am the first and primary provider for this patient AND AM THE PRIMARY PROVIDER OF RECORD. - I reviewed the vital signs, available nursing notes, past medical history, past surgical history, family history and social history.     - Initial assessment performed. The patients presenting problems have been discussed, and the staff are in agreement with the care plan formulated and outlined with them. I have encouraged them to ask questions as they arise throughout their visit. Vital Signs-Reviewed the patient's vital signs. Patient Vitals for the past 24 hrs:   Temp Pulse Resp BP SpO2   03/20/23 2335 -- 63 14 121/80 99 %   03/20/23 2033 97.9 °F (36.6 °C) 73 16 125/77 100 %       Records Reviewed: Nursing notes      Medical Decision Making       Patient is 52-year-old female with past medical history of HTN who comes to the ED with ankle pain. She is denying any preceding trauma. Examination revealed tenderness medially without any swelling or effusion. No redness or change in color. Examination revealed mild tenderness with mild limitation of range of motion. She ambulated with antalgic gait. Differential diagnose includes sprain, strain, and fractures. Patient is denying any constitutional symptoms and there is no symptoms suggestive of acute inflammatory nor infectious process. An x-ray was obtained to rule out fracture. X-ray was unremarkable. Symptomatically improved with analgesics. On reassessment, patient was able to ambulate with some discomfort. Thus, placed an ankle sprain and discharged fobs outpatient with podiatry. Inspiratory guidance return precaution discussed with patient. At the time of discharge, patient is imminently stable, feels better and has no further questions or concerns that should like to address. Diagnosis     Clinical Impression:   1. Acute left ankle pain        Disposition     Disposition: Condition improved  DC- Adult Discharges: All of the diagnostic tests were reviewed and questions answered. Diagnosis, care plan and treatment options were discussed. The patient understands the instructions and will follow up as directed. The patients results have been reviewed with them.   They have been counseled regarding their diagnosis. The patient verbally convey understanding and agreement of the signs, symptoms, diagnosis, treatment and prognosis and additionally agrees to follow up as recommended with their PCP in 24 - 48 hours. They also agree with the care-plan and convey that all of their questions have been answered. I have also put together some discharge instructions for them that include: 1) educational information regarding their diagnosis, 2) how to care for their diagnosis at home, as well a 3) list of reasons why they would want to return to the ED prior to their follow-up appointment, should their condition change. Discharged      DISCHARGE PLAN:  1. Follow-up Information       Follow up With Specialties Details Why [address] [facility]    [address] [facility] EMERGENCY DEPT Emergency Medicine Go to  As needed, If symptoms worsen [address] [facility] [address]  [phone]    [facility] Schedule an appointment as soon as possible for a visit in 3 days For reevaluation, Discuss your visit to the ER [address] [facility] [address]  [phone]            2. Return to ED if worse   3. Discharge Medication List as of 3/20/2023 11:47 PM        START taking these medications    Details   ibuprofen (MOTRIN) 400 mg tablet Take 1 Tablet by mouth every six (6) hours as needed for Pain., Normal, Disp-20 Tablet, R-0      acetaminophen (TYLENOL) 325 mg tablet Take 2 Tablets by mouth every six (6) hours as needed for Pain., Normal, Disp-40 Tablet, R-0           CONTINUE these medications which have NOT CHANGED    Details   gabapentin (NEURONTIN) 100 mg capsule Take  by mouth three (3) times daily. , Historical Med      aspirin-acetaminophen-caffeine (EXCEDRIN ES) 250-250-65 mg per tablet Take 1 Tab by mouth., Historical Med      ondansetron hcl (Zofran) 4 mg tablet Take 2 Tabs by mouth every eight (8) hours as needed for Nausea. , Print, Disp-12 Tab,R-0 Attestations: Annette Barcenas MD    Please note that this dictation was completed with [employer], the computer voice recognition software. Quite often unanticipated grammatical, syntax, homophones, and other interpretive errors are inadvertently transcribed by the computer software. Please disregard these errors. Please excuse any errors that have escaped final proofreading. Thank you.

## 2023-04-07 ENCOUNTER — OFFICE VISIT (OUTPATIENT)
Dept: INTERNAL MEDICINE CLINIC | Age: 53
End: 2023-04-07

## 2023-04-07 PROBLEM — E78.5 DYSLIPIDEMIA: Status: ACTIVE | Noted: 2021-11-29

## 2023-04-07 PROBLEM — M19.90 ARTHRITIS: Status: ACTIVE | Noted: 2022-01-26

## 2023-04-07 PROBLEM — D06.9 SEVERE CERVICAL DYSPLASIA: Status: ACTIVE | Noted: 2022-03-01

## 2023-04-07 PROBLEM — I10 PRIMARY HYPERTENSION: Status: ACTIVE | Noted: 2021-11-29

## 2023-04-07 PROBLEM — R33.8 ACUTE URINARY RETENTION: Status: ACTIVE | Noted: 2022-05-31

## 2023-04-07 PROBLEM — E55.9 VITAMIN D DEFICIENCY: Status: ACTIVE | Noted: 2021-11-29

## 2023-04-07 PROBLEM — G43.709 CHRONIC MIGRAINE WITHOUT AURA WITHOUT STATUS MIGRAINOSUS, NOT INTRACTABLE: Status: ACTIVE | Noted: 2022-03-01

## 2023-04-07 PROBLEM — E05.00 GRAVES' ORBITOPATHY: Status: ACTIVE | Noted: 2020-01-21

## 2023-04-30 DIAGNOSIS — Z12.31 SCREENING MAMMOGRAM FOR BREAST CANCER: Primary | ICD-10-CM

## 2023-05-08 ENCOUNTER — OFFICE VISIT (OUTPATIENT)
Facility: CLINIC | Age: 53
End: 2023-05-08
Payer: MEDICAID

## 2023-05-08 VITALS
SYSTOLIC BLOOD PRESSURE: 133 MMHG | RESPIRATION RATE: 18 BRPM | OXYGEN SATURATION: 97 % | DIASTOLIC BLOOD PRESSURE: 80 MMHG | HEART RATE: 72 BPM | TEMPERATURE: 98.9 F | HEIGHT: 63 IN | WEIGHT: 190.4 LBS | BODY MASS INDEX: 33.73 KG/M2

## 2023-05-08 DIAGNOSIS — I10 PRIMARY HYPERTENSION: ICD-10-CM

## 2023-05-08 DIAGNOSIS — E78.5 DYSLIPIDEMIA: ICD-10-CM

## 2023-05-08 DIAGNOSIS — M19.90 ARTHRITIS: ICD-10-CM

## 2023-05-08 DIAGNOSIS — E55.9 VITAMIN D DEFICIENCY: ICD-10-CM

## 2023-05-08 DIAGNOSIS — F17.200 TOBACCO DEPENDENCE: ICD-10-CM

## 2023-05-08 DIAGNOSIS — G43.709 CHRONIC MIGRAINE WITHOUT AURA WITHOUT STATUS MIGRAINOSUS, NOT INTRACTABLE: ICD-10-CM

## 2023-05-08 DIAGNOSIS — F41.8 DEPRESSION WITH ANXIETY: Primary | ICD-10-CM

## 2023-05-08 PROCEDURE — 99214 OFFICE O/P EST MOD 30 MIN: CPT

## 2023-05-08 PROCEDURE — 3079F DIAST BP 80-89 MM HG: CPT

## 2023-05-08 PROCEDURE — 3075F SYST BP GE 130 - 139MM HG: CPT

## 2023-05-08 RX ORDER — IBUPROFEN 400 MG/1
400 TABLET ORAL EVERY 6 HOURS PRN
COMMUNITY
Start: 2023-03-21

## 2023-05-08 RX ORDER — ESCITALOPRAM OXALATE 10 MG/1
TABLET ORAL
COMMUNITY
Start: 2023-04-07 | End: 2023-05-08 | Stop reason: SDUPTHER

## 2023-05-08 RX ORDER — SIMVASTATIN 20 MG
20 TABLET ORAL NIGHTLY
COMMUNITY
Start: 2023-05-05

## 2023-05-08 RX ORDER — LISINOPRIL 20 MG/1
20 TABLET ORAL DAILY
COMMUNITY
Start: 2023-05-02

## 2023-05-08 RX ORDER — ESCITALOPRAM OXALATE 10 MG/1
TABLET ORAL
Qty: 30 TABLET | Refills: 1 | Status: SHIPPED | OUTPATIENT
Start: 2023-05-08

## 2023-05-08 RX ORDER — ERGOCALCIFEROL 1.25 MG/1
CAPSULE ORAL
COMMUNITY
Start: 2023-04-23

## 2023-05-08 SDOH — ECONOMIC STABILITY: HOUSING INSECURITY
IN THE LAST 12 MONTHS, WAS THERE A TIME WHEN YOU DID NOT HAVE A STEADY PLACE TO SLEEP OR SLEPT IN A SHELTER (INCLUDING NOW)?: YES

## 2023-05-08 SDOH — ECONOMIC STABILITY: FOOD INSECURITY: WITHIN THE PAST 12 MONTHS, YOU WORRIED THAT YOUR FOOD WOULD RUN OUT BEFORE YOU GOT MONEY TO BUY MORE.: SOMETIMES TRUE

## 2023-05-08 SDOH — ECONOMIC STABILITY: INCOME INSECURITY: HOW HARD IS IT FOR YOU TO PAY FOR THE VERY BASICS LIKE FOOD, HOUSING, MEDICAL CARE, AND HEATING?: SOMEWHAT HARD

## 2023-05-08 SDOH — ECONOMIC STABILITY: FOOD INSECURITY: WITHIN THE PAST 12 MONTHS, THE FOOD YOU BOUGHT JUST DIDN'T LAST AND YOU DIDN'T HAVE MONEY TO GET MORE.: SOMETIMES TRUE

## 2023-05-08 ASSESSMENT — PATIENT HEALTH QUESTIONNAIRE - PHQ9
5. POOR APPETITE OR OVEREATING: 3
3. TROUBLE FALLING OR STAYING ASLEEP: 3
7. TROUBLE CONCENTRATING ON THINGS, SUCH AS READING THE NEWSPAPER OR WATCHING TELEVISION: 3
SUM OF ALL RESPONSES TO PHQ QUESTIONS 1-9: 21
SUM OF ALL RESPONSES TO PHQ QUESTIONS 1-9: 23
SUM OF ALL RESPONSES TO PHQ9 QUESTIONS 1 & 2: 5
9. THOUGHTS THAT YOU WOULD BE BETTER OFF DEAD, OR OF HURTING YOURSELF: 2
8. MOVING OR SPEAKING SO SLOWLY THAT OTHER PEOPLE COULD HAVE NOTICED. OR THE OPPOSITE, BEING SO FIGETY OR RESTLESS THAT YOU HAVE BEEN MOVING AROUND A LOT MORE THAN USUAL: 1
4. FEELING TIRED OR HAVING LITTLE ENERGY: 3
10. IF YOU CHECKED OFF ANY PROBLEMS, HOW DIFFICULT HAVE THESE PROBLEMS MADE IT FOR YOU TO DO YOUR WORK, TAKE CARE OF THINGS AT HOME, OR GET ALONG WITH OTHER PEOPLE: 1
SUM OF ALL RESPONSES TO PHQ QUESTIONS 1-9: 23
6. FEELING BAD ABOUT YOURSELF - OR THAT YOU ARE A FAILURE OR HAVE LET YOURSELF OR YOUR FAMILY DOWN: 3
1. LITTLE INTEREST OR PLEASURE IN DOING THINGS: 3
2. FEELING DOWN, DEPRESSED OR HOPELESS: 2
SUM OF ALL RESPONSES TO PHQ QUESTIONS 1-9: 23

## 2023-05-08 ASSESSMENT — ENCOUNTER SYMPTOMS
TROUBLE SWALLOWING: 0
BACK PAIN: 0
VOMITING: 0
COUGH: 0
DIARRHEA: 0
NAUSEA: 0
ABDOMINAL PAIN: 0
SORE THROAT: 0
BLOOD IN STOOL: 0
CHEST TIGHTNESS: 0
PHOTOPHOBIA: 0
EYE PAIN: 0
WHEEZING: 0
SHORTNESS OF BREATH: 0
CONSTIPATION: 0

## 2023-05-08 ASSESSMENT — ANXIETY QUESTIONNAIRES
6. BECOMING EASILY ANNOYED OR IRRITABLE: 3
GAD7 TOTAL SCORE: 15
IF YOU CHECKED OFF ANY PROBLEMS ON THIS QUESTIONNAIRE, HOW DIFFICULT HAVE THESE PROBLEMS MADE IT FOR YOU TO DO YOUR WORK, TAKE CARE OF THINGS AT HOME, OR GET ALONG WITH OTHER PEOPLE: SOMEWHAT DIFFICULT
7. FEELING AFRAID AS IF SOMETHING AWFUL MIGHT HAPPEN: 2
1. FEELING NERVOUS, ANXIOUS, OR ON EDGE: 2
4. TROUBLE RELAXING: 1
5. BEING SO RESTLESS THAT IT IS HARD TO SIT STILL: 3
3. WORRYING TOO MUCH ABOUT DIFFERENT THINGS: 2
2. NOT BEING ABLE TO STOP OR CONTROL WORRYING: 2

## 2023-05-08 ASSESSMENT — COLUMBIA-SUICIDE SEVERITY RATING SCALE - C-SSRS
6. HAVE YOU EVER DONE ANYTHING, STARTED TO DO ANYTHING, OR PREPARED TO DO ANYTHING TO END YOUR LIFE?: NO
2. HAVE YOU ACTUALLY HAD ANY THOUGHTS OF KILLING YOURSELF?: NO
1. WITHIN THE PAST MONTH, HAVE YOU WISHED YOU WERE DEAD OR WISHED YOU COULD GO TO SLEEP AND NOT WAKE UP?: NO

## 2023-05-08 NOTE — PROGRESS NOTES
1. \"Have you been to the ER, urgent care clinic since your last visit? Hospitalized since your last visit? \" No    2. \"Have you seen or consulted any other health care providers outside of the [address] [place] [address] since your last visit? \" No     3. For patients aged 39-70: Has the patient had a colonoscopy / FIT/ Cologuard? Yes - Care Gap present. Rooming MA/LPN to request most recent results    Colonoscopy done with Dr. Carlo Longoria located in [place] [address]     If the patient is female:    4. For patients aged 41-77: Has the patient had a mammogram within the past 2 years? Mammogram done at [facility]     5. For patients aged 21-65: Has the patient had a pap smear?  No    Chief Complaint   Patient presents with    Follow-up    Hypertension    Arthritis    Migraine    Cholesterol Problem     /80 (Site: Right Upper Arm, Position: Sitting, Cuff Size: Large Adult)   Pulse 72   Temp 98.9 °F (37.2 °C) (Oral)   Resp 18   Ht 5' 3\" (1.6 m)   Wt 190 lb 6.4 oz (86.4 kg)   SpO2 97%   BMI 33.73 kg/m²

## 2023-05-08 NOTE — PROGRESS NOTES
Zurdo Campos  47 y.o. female  1970  [address] [address] [address]  [phone]     [facility] INTERNAL MEDICINE: Progress Note       Encounter Date: 5/8/2023    Patient presents with the following chief complaint(s)    Chief Complaint   Patient presents with    Follow-up    Hypertension    Arthritis    Migraine    Cholesterol Problem        History provided by patient  History of Present Illness   Zurdo Campos is a 47 y.o. female with past medical history listed, who presents to clinic today for a follow up visit. She is on Lisinopril 20mg for BP. BP is within normal range today. She was last seen for mood and given Lexapro 10. She says medication is making her lethargic and sensitive to light. She is noticing that she is more short-tempered. She says it is keeping her calm. She has not contacted psych. She feels better since moving out of her parents home. She had her labs completed this morning. She is still taking simvastatin. She is taking IBU for arthritic pain in her elbow. She is still using tobacco products. About 0.5 PPD. She is not ready to quit at this time. Health Maintenance    Health Maintenance Due   Topic Date Due    Pneumococcal 0-64 years Vaccine (1 - PCV) Never done    Lipids  Never done    HIV screen  Never done    Hepatitis C screen  Never done    Colorectal Cancer Screen  Never done    COVID-19 Vaccine (3 - Booster for Pfizer series) 07/15/2021    Shingles vaccine (1 of 2) Never done    Breast cancer screen  03/16/2023       Vitals:     Vitals:    05/08/23 1625   BP: 133/80   Site: Right Upper Arm   Position: Sitting   Cuff Size: Large Adult   Pulse: 72   Resp: 18   Temp: 98.9 °F (37.2 °C)   TempSrc: Oral   SpO2: 97%   Weight: 190 lb 6.4 oz (86.4 kg)   Height: 5' 3\" (1.6 m)     Body mass index is 33.73 kg/m².     Wt Readings from Last 3 Encounters:   05/08/23 190 lb 6.4 oz (86.4 kg)   04/07/23 192 lb (87.1 kg)

## 2023-05-09 LAB
25(OH)D3+25(OH)D2 SERPL-MCNC: 61.7 NG/ML (ref 30–100)
ALBUMIN SERPL-MCNC: 4.2 G/DL (ref 3.8–4.9)
ALBUMIN/GLOB SERPL: 1.6 {RATIO} (ref 1.2–2.2)
ALP SERPL-CCNC: 129 IU/L (ref 44–121)
ALT SERPL-CCNC: 14 IU/L (ref 0–32)
AST SERPL-CCNC: 18 IU/L (ref 0–40)
BILIRUB SERPL-MCNC: 0.2 MG/DL (ref 0–1.2)
BUN SERPL-MCNC: 7 MG/DL (ref 6–24)
BUN/CREAT SERPL: 8 (ref 9–23)
CALCIUM SERPL-MCNC: 9.3 MG/DL (ref 8.7–10.2)
CHLORIDE SERPL-SCNC: 105 MMOL/L (ref 96–106)
CHOLEST SERPL-MCNC: 194 MG/DL (ref 100–199)
CO2 SERPL-SCNC: 24 MMOL/L (ref 20–29)
CREAT SERPL-MCNC: 0.88 MG/DL (ref 0.57–1)
EGFRCR SERPLBLD CKD-EPI 2021: 80 ML/MIN/1.73
ERYTHROCYTE [DISTWIDTH] IN BLOOD BY AUTOMATED COUNT: 12.6 % (ref 11.7–15.4)
EST. AVERAGE GLUCOSE BLD GHB EST-MCNC: 114 MG/DL
GLOBULIN SER CALC-MCNC: 2.6 G/DL (ref 1.5–4.5)
GLUCOSE SERPL-MCNC: 100 MG/DL (ref 70–99)
HBA1C MFR BLD: 5.6 % (ref 4.8–5.6)
HCT VFR BLD AUTO: 37.7 % (ref 34–46.6)
HCV IGG SERPL QL IA: NON REACTIVE
HDLC SERPL-MCNC: 56 MG/DL
HGB BLD-MCNC: 13 G/DL (ref 11.1–15.9)
IRON SATN MFR SERPL: 19 % (ref 15–55)
IRON SERPL-MCNC: 75 UG/DL (ref 27–159)
LDLC SERPL CALC-MCNC: 125 MG/DL (ref 0–99)
MCH RBC QN AUTO: 30.6 PG (ref 26.6–33)
MCHC RBC AUTO-ENTMCNC: 34.5 G/DL (ref 31.5–35.7)
MCV RBC AUTO: 89 FL (ref 79–97)
PLATELET # BLD AUTO: 304 X10E3/UL (ref 150–450)
POTASSIUM SERPL-SCNC: 4.7 MMOL/L (ref 3.5–5.2)
PROT SERPL-MCNC: 6.8 G/DL (ref 6–8.5)
RBC # BLD AUTO: 4.25 X10E6/UL (ref 3.77–5.28)
SODIUM SERPL-SCNC: 142 MMOL/L (ref 134–144)
T4 FREE SERPL-MCNC: 1.19 NG/DL (ref 0.82–1.77)
TIBC SERPL-MCNC: 394 UG/DL (ref 250–450)
TRIGL SERPL-MCNC: 68 MG/DL (ref 0–149)
TSH SERPL DL<=0.005 MIU/L-ACNC: 0.64 UIU/ML (ref 0.45–4.5)
UIBC SERPL-MCNC: 319 UG/DL (ref 131–425)
VLDLC SERPL CALC-MCNC: 13 MG/DL (ref 5–40)
WBC # BLD AUTO: 6.1 X10E3/UL (ref 3.4–10.8)

## 2023-05-19 ENCOUNTER — TELEPHONE (OUTPATIENT)
Facility: CLINIC | Age: 53
End: 2023-05-19

## 2023-07-03 DIAGNOSIS — F41.8 DEPRESSION WITH ANXIETY: ICD-10-CM

## 2023-07-03 RX ORDER — ESCITALOPRAM OXALATE 10 MG/1
TABLET ORAL
Qty: 60 TABLET | Refills: 0 | Status: SHIPPED | OUTPATIENT
Start: 2023-07-03

## 2023-09-01 DIAGNOSIS — F41.8 DEPRESSION WITH ANXIETY: ICD-10-CM

## 2023-09-01 RX ORDER — ESCITALOPRAM OXALATE 10 MG/1
TABLET ORAL
Qty: 60 TABLET | Refills: 0 | Status: SHIPPED | OUTPATIENT
Start: 2023-09-01

## 2023-11-04 DIAGNOSIS — F41.8 DEPRESSION WITH ANXIETY: ICD-10-CM

## 2023-11-06 RX ORDER — ESCITALOPRAM OXALATE 10 MG/1
TABLET ORAL
Qty: 60 TABLET | Refills: 0 | OUTPATIENT
Start: 2023-11-06

## 2023-11-27 RX ORDER — LISINOPRIL 20 MG/1
20 TABLET ORAL DAILY
Qty: 30 TABLET | Refills: 0 | Status: SHIPPED | OUTPATIENT
Start: 2023-11-27 | End: 2024-01-02 | Stop reason: SDUPTHER

## 2023-12-27 ENCOUNTER — TELEPHONE (OUTPATIENT)
Facility: CLINIC | Age: 53
End: 2023-12-27

## 2023-12-27 NOTE — TELEPHONE ENCOUNTER
Patient is calling because she needs refills on her medications.  She stated she moved to [place] and cannot get an appointment until March and needs her meds to carry her until then. Lisinopril 20 mg, vitamin D 1.25 mg., Escitalopram 10 mg, and simvastatin 20 mg. Please send to Manuel at [address] [facility].

## 2024-01-02 DIAGNOSIS — F41.8 DEPRESSION WITH ANXIETY: ICD-10-CM

## 2024-01-02 RX ORDER — SIMVASTATIN 20 MG
20 TABLET ORAL NIGHTLY
Qty: 90 TABLET | Refills: 0 | Status: SHIPPED | OUTPATIENT
Start: 2024-01-02

## 2024-01-02 RX ORDER — ESCITALOPRAM OXALATE 10 MG/1
TABLET ORAL
Qty: 90 TABLET | Refills: 0 | Status: SHIPPED | OUTPATIENT
Start: 2024-01-02

## 2024-01-02 RX ORDER — ERGOCALCIFEROL 1.25 MG/1
50000 CAPSULE ORAL
Qty: 12 CAPSULE | Refills: 0 | Status: SHIPPED | OUTPATIENT
Start: 2024-01-02

## 2024-01-02 RX ORDER — LISINOPRIL 20 MG/1
20 TABLET ORAL DAILY
Qty: 90 TABLET | Refills: 0 | Status: SHIPPED | OUTPATIENT
Start: 2024-01-02

## 2024-03-27 RX ORDER — ERGOCALCIFEROL 1.25 MG/1
50000 CAPSULE ORAL
Qty: 12 CAPSULE | Refills: 0 | OUTPATIENT
Start: 2024-03-27

## 2024-04-01 DIAGNOSIS — F41.8 DEPRESSION WITH ANXIETY: ICD-10-CM

## 2024-04-01 RX ORDER — ESCITALOPRAM OXALATE 10 MG/1
TABLET ORAL
Qty: 90 TABLET | Refills: 0 | OUTPATIENT
Start: 2024-04-01

## 2024-04-02 RX ORDER — SIMVASTATIN 20 MG
20 TABLET ORAL NIGHTLY
Qty: 90 TABLET | Refills: 0 | OUTPATIENT
Start: 2024-04-02